# Patient Record
Sex: MALE | Race: WHITE | NOT HISPANIC OR LATINO | Employment: OTHER | URBAN - NONMETROPOLITAN AREA
[De-identification: names, ages, dates, MRNs, and addresses within clinical notes are randomized per-mention and may not be internally consistent; named-entity substitution may affect disease eponyms.]

---

## 2017-07-27 ENCOUNTER — OFFICE VISIT - GICH (OUTPATIENT)
Dept: FAMILY MEDICINE | Facility: OTHER | Age: 77
End: 2017-07-27

## 2017-07-27 ENCOUNTER — HISTORY (OUTPATIENT)
Dept: FAMILY MEDICINE | Facility: OTHER | Age: 77
End: 2017-07-27

## 2017-07-27 DIAGNOSIS — R06.09 OTHER FORMS OF DYSPNEA: ICD-10-CM

## 2017-07-27 DIAGNOSIS — C61 MALIGNANT NEOPLASM OF PROSTATE (H): ICD-10-CM

## 2017-07-27 LAB
ABSOLUTE BASOPHILS - HISTORICAL: 0 THOU/CU MM
ABSOLUTE EOSINOPHILS - HISTORICAL: 0.4 THOU/CU MM
ABSOLUTE IMMATURE GRANULOCYTES(METAS,MYELOS,PROS) - HISTORICAL: 0 THOU/CU MM
ABSOLUTE LYMPHOCYTES - HISTORICAL: 2.5 THOU/CU MM (ref 0.9–2.9)
ABSOLUTE MONOCYTES - HISTORICAL: 0.8 THOU/CU MM
ABSOLUTE NEUTROPHILS - HISTORICAL: 4.7 THOU/CU MM (ref 1.7–7)
ANION GAP - HISTORICAL: 11 (ref 5–18)
BASOPHILS # BLD AUTO: 0.5 %
BUN SERPL-MCNC: 37 MG/DL (ref 7–25)
BUN/CREAT RATIO - HISTORICAL: 22
CALCIUM SERPL-MCNC: 9.9 MG/DL (ref 8.6–10.3)
CHLORIDE SERPLBLD-SCNC: 106 MMOL/L (ref 98–107)
CO2 SERPL-SCNC: 25 MMOL/L (ref 21–31)
CREAT SERPL-MCNC: 1.65 MG/DL (ref 0.7–1.3)
EOSINOPHIL NFR BLD AUTO: 4.3 %
ERYTHROCYTE [DISTWIDTH] IN BLOOD BY AUTOMATED COUNT: 11.9 % (ref 11.5–15.5)
GFR IF NOT AFRICAN AMERICAN - HISTORICAL: 41 ML/MIN/1.73M2
GLUCOSE SERPL-MCNC: 91 MG/DL (ref 70–105)
HCT VFR BLD AUTO: 37.2 % (ref 37–53)
HEMOGLOBIN: 12.6 G/DL (ref 13.5–17.5)
IMMATURE GRANULOCYTES(METAS,MYELOS,PROS) - HISTORICAL: 0.2 %
LYMPHOCYTES NFR BLD AUTO: 29.8 % (ref 20–44)
MCH RBC QN AUTO: 30.9 PG (ref 26–34)
MCHC RBC AUTO-ENTMCNC: 33.9 G/DL (ref 32–36)
MCV RBC AUTO: 91 FL (ref 80–100)
MONOCYTES NFR BLD AUTO: 10 %
NEUTROPHILS NFR BLD AUTO: 55.2 % (ref 42–72)
PLATELET # BLD AUTO: 223 THOU/CU MM (ref 140–440)
PMV BLD: 10.8 FL (ref 6.5–11)
POTASSIUM SERPL-SCNC: 4.8 MMOL/L (ref 3.5–5.1)
RED BLOOD COUNT - HISTORICAL: 4.08 MIL/CU MM (ref 4.3–5.9)
SODIUM SERPL-SCNC: 142 MMOL/L (ref 133–143)
WHITE BLOOD COUNT - HISTORICAL: 8.4 THOU/CU MM (ref 4.5–11)

## 2017-07-27 ASSESSMENT — PATIENT HEALTH QUESTIONNAIRE - PHQ9: SUM OF ALL RESPONSES TO PHQ QUESTIONS 1-9: 0

## 2017-08-03 ENCOUNTER — AMBULATORY - GICH (OUTPATIENT)
Dept: SCHEDULING | Facility: OTHER | Age: 77
End: 2017-08-03

## 2017-08-08 ENCOUNTER — AMBULATORY - GICH (OUTPATIENT)
Dept: SCHEDULING | Facility: OTHER | Age: 77
End: 2017-08-08

## 2017-09-21 ENCOUNTER — COMMUNICATION - GICH (OUTPATIENT)
Dept: CARDIAC REHAB | Facility: OTHER | Age: 77
End: 2017-09-21

## 2017-09-27 ENCOUNTER — COMMUNICATION - GICH (OUTPATIENT)
Dept: CARDIAC REHAB | Facility: OTHER | Age: 77
End: 2017-09-27

## 2017-10-30 ENCOUNTER — COMMUNICATION - GICH (OUTPATIENT)
Dept: SURGERY | Facility: OTHER | Age: 77
End: 2017-10-30

## 2017-12-27 NOTE — PROGRESS NOTES
Patient Information     Patient Name MRN Gaudencio Harris [id number] Male 1940      Progress Notes by Alonso Skinner MD at 2017  7:45 AM     Author:  Alonso Skinner MD Service:  (none) Author Type:  Physician     Filed:  2017  8:32 AM Encounter Date:  2017 Status:  Signed     :  Alonso Skinner MD (Physician)            SUBJECTIVE:  Gaudencio Jorge is a 77 y.o. male who presents with his wife for evaluation of shortness of breath, fatigue and chest pain. Over the past couple of months has noted significant dyspnea on exertion. Even 1 flight of stairs will cause him to feel short of breath. During this time he has had several episodes of a mild discomfort in his chest usually at rest that resolves within a couple of minutes. Does not seem to note chest discomfort with exertion. He also has been extremely fatigued and wanting to sleep more than usual. He does have a history of coronary artery disease with stents placed on 2 different dates in . He does have sublingual nitroglycerin at home but has not had to use this. His blood pressure was labile over the winter and he reports having occasional dizzy spells. Above symptoms are somewhere to what he had prior to stenting. They did schedule an appointment with cardiology in [place] for August 3 but were advised to come in today to be evaluated. He reports being diagnosed with prostate cancer recently as well and [facility]    No Known Allergies,   Current Outpatient Prescriptions on File Prior to Visit       Medication  Sig Dispense Refill     aspirin 325 mg tablet Take 1 tablet by mouth once daily with a meal.  0     atenolol (TENORMIN) 50 mg tablet Take 1 tablet by mouth once daily. 90 tablet 3     hyalur ac-chond sul-colg II-AA (HYALURONIC ACID) 40- mg cap Take 20 mg by mouth 2 times daily.       nitroglycerin (NITROSTAT) 0.4 mg SL tablet Place 0.4 mg under the tongue every 5 minutes  "if needed.       omeprazole (PRILOSEC) 20 mg capsule Take 20 mg by mouth once daily before a meal.       No current facility-administered medications on file prior to visit.    , No past medical history on file. and   Past Surgical History:      Procedure  Laterality Date     ANGIOPLASTY  03/22/04    Acute coronary syndrome, hospitalized at [facility] after transfer from [facility].  Cardiac catheterization with angioplasty to right coronary artery with stent placement.  Normal left ventricular systolic performance with an EF of 60%.       COLONOSCOPY SCREENING  12/07    Screening colonoscopy, follow-up recommended in 10 years       DENTAL SURGERY  12/04    Dental extractions       HERNIA REPAIR      Status post left herniorrhaphy        KNEE REPLACEMENT  07/09    Left total knee arthroplasty       PREMALIG/BENIGN SKIN LESION EXCISION      Skin lesion removals       PTCA  05/21/04    Stenting of the first diagonal branch for an acute coronary syndrome, [facility] in [place], Dr. Chantelle Zapata.            Gastrointestinal: Negative  Genitourinary: See history of present illness    OBJECTIVE:  /80  Pulse 54  Temp 97.5  F (36.4  C) (Temporal)  Ht 1.721 m (5' 7.75\")  Wt 86.6 kg (191 lb)  SpO2 100%  BMI 29.26 kg/m2  EXAM:  General Appearance: Pleasant, alert, appropriate appearance for age. No acute distress  Head Exam: Normocephalic, without obvious abnormality.  Chest/Respiratory Exam: Normal chest wall and respirations. Clear to auscultation.  Cardiovascular Exam: Regular rate and rhythm with a faint systolic murmur noted throughout the precordium.  Foot Exam: Normal.    EKG shows a new right bundle branch block. Sinus bradycardia with first-degree AV block was noted previously. Occasional PVCs also evident.    ASSESSMENT/Plan :      ICD-10-CM    1. KELLEY (dyspnea on exertion)  CBC and basic metabolic panel are pending. He will keep his appointment next week with cardiology for further " evaluation. Symptoms consistent with coronary artery disease. Continue current medications. He will call 911 if he develops persistent symptoms.  R06.09 EKG 12 LEAD UNIT PERFORMED (PERFORMED TODAY)      CBC WITH DIFFERENTIAL      BASIC METABOLIC PANEL      CBC WITH DIFFERENTIAL      BASIC METABOLIC PANEL      CBC WITH AUTO DIFFERENTIAL      W[place] ELECTROCARDIOGRAM TRACING   2. Prostate cancer ([facility])  [facility].  C61        Alonso Skinner MD

## 2017-12-28 NOTE — TELEPHONE ENCOUNTER
Patient Information     Patient Name MRN Sex Gaudencio Henderson [id number] Male 1940      Telephone Encounter by Jenna Villegas RN at 2017 10:08 AM     Author:  Jenna Villegas RN Service:  (none) Author Type:  NURS- Registered Nurse     Filed:  2017 10:11 AM Encounter Date:  2017 Status:  Signed     :  Jenna Villegas RN (NURS- Registered Nurse)            Spoke  With patient re: cardiac rehab intake and patient declines at this time as he is getting ready to leave for [place] for the winter and has an exercise program he is doing at home

## 2017-12-30 NOTE — NURSING NOTE
Patient Information     Patient Name MRN Gaudencio Harris [id number] Male 1940      Nursing Note by Gosselin, Norma J at 2017  7:45 AM     Author:  Gosselin, Norma J Service:  (none) Author Type:  (none)     Filed:  2017  7:56 AM Encounter Date:  2017 Status:  Signed     :  Gosselin, Norma J            Patient presents to clinic for shortness of breath when active and fatigue. He also reports he's had chest pain/discomfort 4 times in 2 months.  Norma J Gosselin LPN....................  2017   7:53 AM

## 2018-01-26 VITALS
HEIGHT: 68 IN | SYSTOLIC BLOOD PRESSURE: 138 MMHG | WEIGHT: 191 LBS | DIASTOLIC BLOOD PRESSURE: 80 MMHG | OXYGEN SATURATION: 100 % | HEART RATE: 54 BPM | TEMPERATURE: 97.5 F | BODY MASS INDEX: 28.95 KG/M2

## 2018-02-02 ASSESSMENT — PATIENT HEALTH QUESTIONNAIRE - PHQ9: SUM OF ALL RESPONSES TO PHQ QUESTIONS 1-9: 0

## 2018-03-08 ENCOUNTER — DOCUMENTATION ONLY (OUTPATIENT)
Dept: FAMILY MEDICINE | Facility: OTHER | Age: 78
End: 2018-03-08

## 2018-03-08 PROBLEM — M19.90 OSTEOARTHROSIS: Status: ACTIVE | Noted: 2018-03-08

## 2018-03-08 PROBLEM — E66.9 OBESITY: Status: ACTIVE | Noted: 2018-03-08

## 2018-03-08 PROBLEM — Z87.891 PERSONAL HISTORY OF TOBACCO USE, PRESENTING HAZARDS TO HEALTH: Status: ACTIVE | Noted: 2018-03-08

## 2018-03-08 PROBLEM — I10 HYPERTENSION: Status: ACTIVE | Noted: 2018-03-08

## 2018-03-08 PROBLEM — C61 PROSTATE CANCER (H): Status: ACTIVE | Noted: 2017-07-27

## 2018-03-08 PROBLEM — E78.5 HYPERLIPIDEMIA: Status: ACTIVE | Noted: 2018-03-08

## 2018-03-08 RX ORDER — CHONDROIT/COLLAGEN/HYALURON/AA 80-400-40
20 CAPSULE ORAL 2 TIMES DAILY
COMMUNITY
End: 2023-04-24

## 2018-03-08 RX ORDER — LISINOPRIL AND HYDROCHLOROTHIAZIDE 20; 25 MG/1; MG/1
1 TABLET ORAL DAILY
COMMUNITY
End: 2023-04-24

## 2018-03-08 RX ORDER — ROSUVASTATIN CALCIUM 40 MG/1
40 TABLET, COATED ORAL DAILY
COMMUNITY

## 2018-03-08 RX ORDER — IBUPROFEN 200 MG
600 TABLET ORAL 4 TIMES DAILY PRN
COMMUNITY
Start: 2017-07-27 | End: 2019-04-12

## 2018-03-08 RX ORDER — ATENOLOL 50 MG/1
50 TABLET ORAL DAILY
COMMUNITY
Start: 2013-08-05 | End: 2019-04-12

## 2018-03-08 RX ORDER — ASPIRIN 325 MG
325 TABLET ORAL
COMMUNITY
Start: 2013-08-05 | End: 2019-04-12

## 2018-03-08 RX ORDER — NITROGLYCERIN 0.4 MG/1
0.4 TABLET SUBLINGUAL EVERY 5 MIN PRN
COMMUNITY

## 2018-06-27 DIAGNOSIS — M75.42 IMPINGEMENT SYNDROME, SHOULDER, LEFT: Primary | ICD-10-CM

## 2018-06-27 DIAGNOSIS — S46.009A ROTATOR CUFF INJURY: ICD-10-CM

## 2018-06-28 ENCOUNTER — HOSPITAL ENCOUNTER (OUTPATIENT)
Dept: PHYSICAL THERAPY | Facility: OTHER | Age: 78
Setting detail: THERAPIES SERIES
End: 2018-06-28
Attending: PHYSICIAN ASSISTANT
Payer: COMMERCIAL

## 2018-06-28 PROCEDURE — 97162 PT EVAL MOD COMPLEX 30 MIN: CPT | Mod: GP

## 2018-06-28 PROCEDURE — 40000185 ZZHC STATISTIC PT OUTPT VISIT

## 2018-06-28 PROCEDURE — 97110 THERAPEUTIC EXERCISES: CPT | Mod: GP

## 2018-06-28 NOTE — PROGRESS NOTES
06/28/18 0700   General Information   Type of Visit Initial OP Ortho PT Evaluation   Start of Care Date 06/28/18   Referring Physician Alonso Nguyễn PA-C   Patient/Family Goals Statement decrease pain   Orders Evaluate and Treat   Orders Comment RTC and scapular strengthening   Date of Order 06/27/18   [facility] Type Other  ([facility])   Insurance Comments/Visits Authorized 8   Medical Diagnosis L shoulder impingement   Surgical/Medical history reviewed Yes  (current prostate cancer & found spot on lung,HBP,stents,TKA)   Body Part(s)   Body Part(s) Shoulder   Presentation and Etiology   Pertinent history of current problem (include personal factors and/or comorbidities that impact the POC) L shoulder has been bothering him since last fall injection 6/26/18 and yesterday no different, today better.  Pain increased with reaching back to reach table behind from chair, has to reach over with R hand to get cup of cofffy.  Roll up window.  Donning sweater.  1/10 at rest, 8-9/10 with certain movments.  Workout daily, lift weights, bench press 95#, overhead press 20# B, bicep curls 20# B, tricep extensions overhead 15#, supine fly 15# B, can put aspercream and tylenol but no help.  IBP helped but having kidney disease.  Feels better after workout.  Getting up in morning can feel it.      Impairments A. Pain   Functional Limitations perform activities of daily living   Symptom Location L shoulder   How/Where did it occur From insidious onset   Onset date of current episode/exacerbation 09/01/17   Chronicity Chronic   Pain rating (0-10 point scale) Best (/10);Worst (/10)   Best (/10) 1   Worst (/10) 9   Pain quality A. Sharp   Frequency of pain/symptoms B. Intermittent   Pain/symptoms exacerbated by G. Certain positions   Pain/symptoms eased by F. Certain positions   Fall Risk Screen   Fall screen completed by PT   Have you fallen 2 or more times in the past year? No   Have you fallen and had an injury in the past year? No   Is  patient a fall risk? No   Shoulder Objective Findings   Observation dealing with cancer, twin brother tripple bipass   Posture depressed position L shoulder   Shoulder ROM Comment flex R 150, L 138, abd R 135, L 123, ER R 85, L 80, IR R 47, L 50   Scapulothoracic Rhythm more scapular movement with L than R with scaption   Neer's Test positive L for impingment   Burton-Tanner Test positive L for impingment   Planned Therapy Interventions   Planned Therapy Interventions joint mobilization;ROM;strengthening;stretching;neuromuscular re-education   Planned Modality Interventions   Planned Modality Interventions Cryotherapy  (with vasopnuematic compression)   Clinical Impression   Criteria for Skilled Therapeutic Interventions Met yes, treatment indicated   PT Diagnosis L shoulder impingment   Influenced by the following impairments pain, decreased ROM   Functional limitations due to impairments limited dressing, reaching, roll up window in truck   Clinical Presentation Evolving/Changing   Clinical Presentation Rationale chronic, age, co-morbidities   Clinical Decision Making (Complexity) Moderate complexity   Therapy Frequency 1 time/week   Predicted Duration of Therapy Intervention (days/wks) up to 12 weeks (up to 8 visits)   Risk & Benefits of therapy have been explained Yes   Patient, Family & other staff in agreement with plan of care Yes   ORTHO GOALS   PT Ortho Eval Goals 1;2;3   Ortho Goal 1   Goal Identifier reaching   Goal Description Pt able to reach behind himself to get coffee cup off side tablewithout pain in 12 weeks.    Target Date 09/20/18   Ortho Goal 2   Goal Identifier dressing   Goal Description Pt able to don shirt without pain in 12 weeks   Target Date 09/20/18   Ortho Goal 3   Goal Identifier HEP   Goal Description pt able to manage any residual sx with HEP in 12 weeks.    Target Date 09/20/18   Total Evaluation Time   Total Evaluation Time 30

## 2018-07-06 ENCOUNTER — HOSPITAL ENCOUNTER (OUTPATIENT)
Dept: PHYSICAL THERAPY | Facility: OTHER | Age: 78
Setting detail: THERAPIES SERIES
End: 2018-07-06
Attending: PHYSICIAN ASSISTANT
Payer: COMMERCIAL

## 2018-07-06 PROCEDURE — 40000185 ZZHC STATISTIC PT OUTPT VISIT

## 2018-07-06 PROCEDURE — 97110 THERAPEUTIC EXERCISES: CPT | Mod: GP

## 2018-07-23 ENCOUNTER — HOSPITAL ENCOUNTER (OUTPATIENT)
Dept: PHYSICAL THERAPY | Facility: OTHER | Age: 78
Setting detail: THERAPIES SERIES
End: 2018-07-23
Payer: COMMERCIAL

## 2018-07-23 PROCEDURE — 40000185 ZZHC STATISTIC PT OUTPT VISIT

## 2018-07-23 PROCEDURE — 97110 THERAPEUTIC EXERCISES: CPT | Mod: GP

## 2018-07-23 NOTE — PROGRESS NOTES
Patient Information     Patient Name  Gaudencio Jorge MRN  [id number] Sex  Male   1940      Letter by Marilee Patel at      Author:  Marilee Patel Service:  (none) Author Type:  (none)    Filed:   Encounter Date:  10/30/2017 Status:  (Other)           Gaudencio Jorge  [address] [facility] [address]          2017    Dear Mr. Jorge:    It has come to our attention that you are due for a colonoscopy.  According to our records, your last colonoscopy was done on 07.  It  is time for your repeat colonoscopy.  Please contact the Surgery department at [phone] and we will be happy to set up this colonoscopy for you.  If you have had a colonoscopy since your last one with us, please let us know so we can update our records.      Sincerely,       Marilee HUIZAR LPN  General Surgery      Reviewed and electronically signed by provider.

## 2018-07-23 NOTE — PROGRESS NOTES
Patient Information     Patient Name  Gaudencio Jorge MRN  [id number] Sex  Male   1940      Letter by Alonso Skinner MD at      Author:  Alonso Skinner MD Service:  (none) Author Type:  (none)    Filed:   Encounter Date:  2017 Status:  (Other)       Gaudencio Jorge  [address]  [place] [address]    2017      Dear Mr. Jorge,      Please find enclosed a copy of your recent laboratory studies.    Your basic metabolic panel was notable for slightly decreased kidney function. This has worsened slightly compared to 3 years ago.     Your CBC was normal except for a slightly low hemoglobin. This can sometimes be seen with decreased kidney function. It looks like you are due for colonoscopy this year and I would recommend scheduling this at your convenience.      Results for orders placed or performed in visit on 17      BASIC METABOLIC PANEL      Result  Value Ref Range    SODIUM 142 133 - 143 mmol/L    POTASSIUM 4.8 3.5 - 5.1 mmol/L    CHLORIDE 106 98 - 107 mmol/L    CO2,TOTAL 25 21 - 31 mmol/L    ANION GAP 11 5 - 18                    GLUCOSE 91 70 - 105 mg/dL    CALCIUM 9.9 8.6 - 10.3 mg/dL    BUN 37 (H) 7 - 25 mg/dL    CREATININE 1.65 (H) 0.70 - 1.30 mg/dL    BUN/CREAT RATIO           22                    GFR if African American 50 (L) >60 ml/min/1.73m2    GFR if not  41 (L) >60 ml/min/1.73m2   CBC WITH AUTO DIFFERENTIAL      Result  Value Ref Range    WHITE BLOOD COUNT         8.4 4.5 - 11.0 thou/cu mm    RED BLOOD COUNT           4.08 (L) 4.30 - 5.90 mil/cu mm    HEMOGLOBIN                12.6 (L) 13.5 - 17.5 g/dL    HEMATOCRIT                37.2 37.0 - 53.0 %    MCV                       91 80 - 100 fL    MCH                       30.9 26.0 - 34.0 pg    MCHC                      33.9 32.0 - 36.0 g/dL    RDW                       11.9 11.5 - 15.5 %    PLATELET COUNT            223 140 - 440 thou/cu mm    MPV                       10.8 6.5 - 11.0 fL     NEUTROPHILS               55.2 42.0 - 72.0 %    LYMPHOCYTES               29.8 20.0 - 44.0 %    MONOCYTES                 10.0 <12.0 %    EOSINOPHILS               4.3 <8.0 %    BASOPHILS                 0.5 <3.0 %    IMMATURE GRANULOCYTES(METAS,MYELOS,PROS) 0.2 %    ABSOLUTE NEUTROPHILS      4.7 1.7 - 7.0 thou/cu mm    ABSOLUTE LYMPHOCYTES      2.5 0.9 - 2.9 thou/cu mm    ABSOLUTE MONOCYTES        0.8 <0.9 thou/cu mm    ABSOLUTE EOSINOPHILS      0.4 <0.5 thou/cu mm    ABSOLUTE BASOPHILS        0.0 <0.3 thou/cu mm    ABSOLUTE IMMATURE GRANULOCYTES(METAS,MYELOS,PROS) 0.0 <=0.3 thou/cu mm       It was a pleasure seeing you recently in the clinic.  If you have any questions regarding these results, please feel free to call.      Sincerely,       Alonso Skinner MD

## 2018-07-30 ENCOUNTER — HOSPITAL ENCOUNTER (OUTPATIENT)
Dept: PHYSICAL THERAPY | Facility: OTHER | Age: 78
Setting detail: THERAPIES SERIES
End: 2018-07-30
Attending: PHYSICIAN ASSISTANT
Payer: COMMERCIAL

## 2018-07-30 PROCEDURE — 97110 THERAPEUTIC EXERCISES: CPT | Mod: GP

## 2018-07-30 PROCEDURE — 40000185 ZZHC STATISTIC PT OUTPT VISIT

## 2018-07-30 NOTE — PROGRESS NOTES
Outpatient Physical Therapy Discharge Note     Patient: Gaudencio Jorge  : 1940    Beginning/End Dates of Reporting Period:  18 to 2018    Referring Provider: Alonso Nguyễn PA-C    Therapy Diagnosis: L shoulder pain     Client Self Report: Getting energy back, was at wedding last weekend. No increase of pain with increased activity.   He has a biopsy later this week, comfortable with HEP and no further pain so we'll d/c from PT today.     Objective Measurements:  Objective Measure: pain  Details: 0/10, being cautious     Goals:  Goal Identifier reaching   Goal Description Pt able to reach behind himself to get coffee cup off side tablewithout pain in 12 weeks.    Target Date 18   Date Met  18   Progress:     Goal Identifier dressing   Goal Description Pt able to don shirt without pain in 12 weeks   Target Date 18   Date Met  18   Progress:     Goal Identifier HEP   Goal Description pt able to manage any residual sx with HEP in 12 weeks.    Target Date 18   Date Met  18   Progress:     Progress Toward Goals:   Progress this reporting period: met    Plan:  Discharge from therapy.    Discharge:    Reason for Discharge: Patient has met all goals.    Equipment Issued: none    Discharge Plan: Patient to continue home program.

## 2018-08-09 NOTE — ADDENDUM NOTE
Encounter addended by: Brittanie Brown, PT on: 8/9/2018  3:55 PM<BR>     Actions taken: Charge Capture section accepted

## 2019-04-12 ENCOUNTER — OFFICE VISIT (OUTPATIENT)
Dept: FAMILY MEDICINE | Facility: OTHER | Age: 79
End: 2019-04-12
Attending: FAMILY MEDICINE
Payer: MEDICARE

## 2019-04-12 VITALS
RESPIRATION RATE: 18 BRPM | HEIGHT: 68 IN | TEMPERATURE: 97.1 F | DIASTOLIC BLOOD PRESSURE: 86 MMHG | HEART RATE: 88 BPM | SYSTOLIC BLOOD PRESSURE: 138 MMHG | OXYGEN SATURATION: 97 % | BODY MASS INDEX: 28.07 KG/M2 | WEIGHT: 185.2 LBS

## 2019-04-12 DIAGNOSIS — N18.30 CHRONIC KIDNEY DISEASE, STAGE III (MODERATE) (H): ICD-10-CM

## 2019-04-12 DIAGNOSIS — C61 PROSTATE CANCER (H): ICD-10-CM

## 2019-04-12 DIAGNOSIS — I10 ESSENTIAL HYPERTENSION: ICD-10-CM

## 2019-04-12 DIAGNOSIS — I25.9 ISCHEMIC HEART DISEASE: ICD-10-CM

## 2019-04-12 DIAGNOSIS — Z01.818 PREOP GENERAL PHYSICAL EXAM: Primary | ICD-10-CM

## 2019-04-12 LAB
ANION GAP SERPL CALCULATED.3IONS-SCNC: 12 MMOL/L (ref 3–14)
BUN SERPL-MCNC: 41 MG/DL (ref 7–25)
CALCIUM SERPL-MCNC: 10 MG/DL (ref 8.6–10.3)
CHLORIDE SERPL-SCNC: 105 MMOL/L (ref 98–107)
CO2 SERPL-SCNC: 24 MMOL/L (ref 21–31)
CREAT SERPL-MCNC: 2.13 MG/DL (ref 0.7–1.3)
ERYTHROCYTE [DISTWIDTH] IN BLOOD BY AUTOMATED COUNT: 12.1 % (ref 10–15)
GFR SERPL CREATININE-BSD FRML MDRD: 30 ML/MIN/{1.73_M2}
GLUCOSE SERPL-MCNC: 142 MG/DL (ref 70–105)
HCT VFR BLD AUTO: 37.4 % (ref 40–53)
HGB BLD-MCNC: 12.6 G/DL (ref 13.3–17.7)
MCH RBC QN AUTO: 30.7 PG (ref 26.5–33)
MCHC RBC AUTO-ENTMCNC: 33.7 G/DL (ref 31.5–36.5)
MCV RBC AUTO: 91 FL (ref 78–100)
PLATELET # BLD AUTO: 263 10E9/L (ref 150–450)
POTASSIUM SERPL-SCNC: 4.1 MMOL/L (ref 3.5–5.1)
RBC # BLD AUTO: 4.11 10E12/L (ref 4.4–5.9)
SODIUM SERPL-SCNC: 141 MMOL/L (ref 134–144)
WBC # BLD AUTO: 11 10E9/L (ref 4–11)

## 2019-04-12 PROCEDURE — 93010 ELECTROCARDIOGRAM REPORT: CPT | Performed by: INTERNAL MEDICINE

## 2019-04-12 PROCEDURE — G0463 HOSPITAL OUTPT CLINIC VISIT: HCPCS

## 2019-04-12 PROCEDURE — 93005 ELECTROCARDIOGRAM TRACING: CPT

## 2019-04-12 PROCEDURE — 36415 COLL VENOUS BLD VENIPUNCTURE: CPT | Performed by: FAMILY MEDICINE

## 2019-04-12 PROCEDURE — 99214 OFFICE O/P EST MOD 30 MIN: CPT | Performed by: FAMILY MEDICINE

## 2019-04-12 PROCEDURE — 80048 BASIC METABOLIC PNL TOTAL CA: CPT | Performed by: FAMILY MEDICINE

## 2019-04-12 PROCEDURE — 85027 COMPLETE CBC AUTOMATED: CPT | Performed by: FAMILY MEDICINE

## 2019-04-12 RX ORDER — METOPROLOL SUCCINATE 50 MG/1
50 TABLET, EXTENDED RELEASE ORAL
COMMUNITY
Start: 2017-08-24 | End: 2023-04-24

## 2019-04-12 ASSESSMENT — PAIN SCALES - GENERAL: PAINLEVEL: NO PAIN (0)

## 2019-04-12 ASSESSMENT — MIFFLIN-ST. JEOR: SCORE: 1539.56

## 2019-04-12 NOTE — LETTER
April 12, 2019      Gaudencio Jorge  [address] [place] [address]        Dear ,    We are writing to inform you of your test results.  Electrolytes are normal.  Your creatinine, which is a kidney test is higher than the last check here from July 2017.  Additionally the urea nitrogen level is also higher.  These are both measurements of kidney function and have increased.  This could be because of kidney damage, often due to high blood pressure.  However it could be from urinary blockage that can happen with prostate enlargement or prostate cancer.  I would make sure that the urologist and [facility] are aware of the current level.  If it has increased from your previous creatinine [facility] the [facility], then you may need an ultrasound to look for urinary obstruction.  If you do not have urinary obstruction, then typically I would have you see a nephrologist.  We need to carefully manage and treat kidney disease to prevent further deterioration.    Your blood count is stable from the last check.  There is mild anemia, but this can be seen with aging as well as with chronic kidney disease.    None of your labs prevent having surgery.  You are still cleared to have the upcoming prostate volume study.      Resulted Orders   Basic Metabolic Panel   Result Value Ref Range    Sodium 141 134 - 144 mmol/L    Potassium 4.1 3.5 - 5.1 mmol/L    Chloride 105 98 - 107 mmol/L    Carbon Dioxide 24 21 - 31 mmol/L    Anion Gap 12 3 - 14 mmol/L    Glucose 142 (H) 70 - 105 mg/dL    Urea Nitrogen 41 (H) 7 - 25 mg/dL    Creatinine 2.13 (H) 0.70 - 1.30 mg/dL    GFR Estimate 30 (L) >60 mL/min/[1.73_m2]    GFR Estimate If Black 37 (L) >60 mL/min/[1.73_m2]    Calcium 10.0 8.6 - 10.3 mg/dL   CBC W PLT No Diff   Result Value Ref Range    WBC 11.0 4.0 - 11.0 10e9/L    RBC Count 4.11 (L) 4.4 - 5.9 10e12/L    Hemoglobin 12.6 (L) 13.3 - 17.7 g/dL    Hematocrit 37.4 (L) 40.0 - 53.0 %    MCV 91 78 - 100 fl     MCH 30.7 26.5 - 33.0 pg    MCHC 33.7 31.5 - 36.5 g/dL    RDW 12.1 10.0 - 15.0 %    Platelet Count 263 150 - 450 10e9/L       If you have any questions or concerns, please call the clinic at the number listed above.       Sincerely,        Tan Bentley MD

## 2019-04-12 NOTE — PROGRESS NOTES
[facility] AND [facility]  [address]  [place] [address]  [phone]  Dept: [phone]    PRE-OP EVALUATION:  Today's date: 2019    Gaudencio Jorge (: 1940) presents for pre-operative evaluation assessment as requested by Dr. Bentley.  He requires evaluation and anesthesia risk assessment prior to undergoing surgery/procedure for treatment of Volume study for prostate cancer .    Proposed Surgery/ Procedure: volume study for prostate cancer  Date of Surgery/ Procedure: 19  Time of Surgery/ Procedure: [facility]  Hospital/Surgical Facility: [place], [place]  Fax number for surgical facility:   Primary Physician: No Ref-Primary, Physician  Type of Anesthesia Anticipated: to be determined    Patient has a Health Care Directive or Living Will:  NO    1. YES - DO YOU HAVE A HISTORY OF HEART ATTACK, STROKE, STENT, BYPASS OR SURGERY ON AN ARTERY IN THE HEAD, NECK, HEART OR LEG? Pt has had stents placed  2. NO - Do you ever have any pain or discomfort in your chest?  3. NO - Do you have a history of  Heart Failure?  4. NO - Are you troubled by shortness of breath when: walking on the level, up a slight hill or at night?  5. NO - Do you currently have a cold, bronchitis or other respiratory infection?  6. NO - Do you have a cough, shortness of breath or wheezing?  7. NO - Do you sometimes get pains in the calves of your legs when you walk?  8. NO - Do you or anyone in your family have previous history of blood clots?  9. NO - Do you or does anyone in your family have a serious bleeding problem such as prolonged bleeding following surgeries or cuts?  10. NO - Have you ever had problems with anemia or been told to take iron pills?  11. NO - Have you had any abnormal blood loss such as black, tarry or bloody stools, or abnormal vaginal bleeding?  12. NO - Have you ever had a blood transfusion?  13. NO - Have you or any of your relatives ever had problems with  anesthesia?  14. NO - Do you have sleep apnea, excessive snoring or daytime drowsiness?  15. NO - Do you have any prosthetic heart valves?  16. NO - Do you have prosthetic joints?  17. NO - Is there any chance that you may be pregnant?      HPI:     HPI related to upcoming procedure: 79 year old male with history of CAD here for preoperative clearance prior to a volume study prior to brachytherapy for prostate cancer.     History of CAD, stenting in 2004. [facility] Blood pressure initially elevated here, but he was just shoveling. Improved on recheck.    He overall feels well. [place] in [place], returned recently to [place], he says just in time to have to shovel snow today. He has been able to shovel for a few hours today without CP or SOB. Works out regularly without problems.      MEDICAL HISTORY:     Patient Active Problem List    Diagnosis Date Noted     Osteoarthrosis 03/08/2018     Priority: Medium     Overview:   shoulder, elbows and knees       Hyperlipidemia 03/08/2018     Priority: Medium     Hypertension 03/08/2018     Priority: Medium     Obesity 03/08/2018     Priority: Medium     Personal history of tobacco use, presenting hazards to health 03/08/2018     Priority: Medium     Prostate cancer (H) 07/27/2017     Priority: Medium     Chronic kidney disease, stage III (moderate) (H) 08/16/2011     Priority: Medium     GE reflux 08/10/2010     Priority: Medium     History of colonic polyps 12/01/2007     Priority: Medium     Overview:   hyperplastic polyp noted on colonoscopy       Ischemic heart disease 03/22/2004     Priority: Medium     Overview:   status post acute myocardial infarction with PTCA and stent placement        History reviewed. No pertinent past medical history.  Past Surgical History:   Procedure Laterality Date     ANGIOPLASTY      03/22/04,Acute coronary syndrome, hospitalized at [facility] after transfer from [facility].  Cardiac catheterization with  angioplasty to right coronary artery with stent placement.  Normal left ventricular systolic performance with an EF of 60%.     ARTHROPLASTY KNEE      ,Left total knee arthroplasty     COLONOSCOPY      ,Screening colonoscopy, follow-up recommended in 10 years     DENTAL SURGERY      ,Dental extractions     OTHER SURGICAL HISTORY      ,HERNIA REPAIR,Status post left herniorrhaphy     OTHER SURGICAL HISTORY      [id number],PREMALIG/BENIGN SKIN LESION EXCISION,Skin lesion removals     OTHER SURGICAL HISTORY      04,,PTCA,Stenting of the first diagonal branch for an acute coronary syndrome, [facility] in [place], Dr. Chantelle Zapata.     Current Outpatient Medications   Medication Sig Dispense Refill     aspirin (ASA) 81 MG tablet Take 1 tablet (81 mg) by mouth daily       Hyaluronic Acid 400-80-40 MG CAPS Take 20 mg by mouth 2 times daily       lisinopril-hydrochlorothiazide (PRINZIDE/ZESTORETIC) 20-25 MG per tablet Take 1 tablet by mouth daily       metoprolol succinate ER (TOPROL-XL) 50 MG 24 hr tablet Take 50 mg by mouth       nitroGLYcerin (NITROSTAT) 0.4 MG sublingual tablet Place 0.4 mg under the tongue every 5 minutes as needed       omeprazole (PRILOSEC) 20 MG CR capsule Take 20 mg by mouth daily       rosuvastatin (CRESTOR) 40 MG tablet Take 40 mg by mouth daily       OTC products: None, except as noted above    No Known Allergies   Latex Allergy: NO    Social History     Tobacco Use     Smoking status: Former Smoker     Last attempt to quit: 2002     Years since quittin.2     Smokeless tobacco: Former User   Substance Use Topics     Alcohol use: Yes     Alcohol/week: 0.5 oz     History   Drug Use Unknown     Comment: Drug use: Not Asked       REVIEW OF SYSTEMS:   General: Denies general constitutional problems  Eyes: Denies problems  Ears/Nose/Throat: Denies problems  Cardiovascular: Denies problems  Respiratory: Denies problems  Gastrointestinal: Denies problems with  "heartburn  Musculoskeletal: Hurt back in [place], not working out as much lately  Neurologic: Denies problems  Psychiatric: Denies problems      EXAM:   /80   Pulse 88   Temp 97.1  F (36.2  C) (Tympanic)   Resp 18   Ht 1.727 m (5' 8\")   Wt 84 kg (185 lb 3.2 oz)   SpO2 97%   BMI 28.16 kg/m    General Appearance: Pleasant, alert, appropriate appearance for age. No acute distress  Ear Exam: Normal TM's bilaterally.   OroPharynx Exam: Dental hygiene adequate. Normal buccal mucosa. Normal pharynx. Mallampati 2/4.  Neck Exam: Supple, no masses or nodes.  Chest/Respiratory Exam: Normal chest wall and respirations. Clear to auscultation.  Cardiovascular Exam: Regular rate and rhythm. S1, S2, no murmur, click, gallop, or rubs.  Extremities: 2 + pedal pulses.  No lower extremity edema.  Neurologic Exam: Nonfocal; symmetric DTRs, normal gross motor movement, tone, and coordination. No tremor.   Psychiatric: Normal affect and mentation     DIAGNOSTICS:   EKG: sinus rhythm with RBBB. GA interval 196 ms, was 220 ms on last EKG from 2017. Otherwise unchanged.    Recent Labs   Lab Test 07/27/17  1002 07/27/17  0842 08/05/13  1038   HGB  --  12.6*  --    PLT  --  223  --      --  143   POTASSIUM 4.8  --  4.4   CR 1.65*  --  1.41*      Results for orders placed or performed in visit on 04/12/19   Basic Metabolic Panel   Result Value Ref Range    Sodium 141 134 - 144 mmol/L    Potassium 4.1 3.5 - 5.1 mmol/L    Chloride 105 98 - 107 mmol/L    Carbon Dioxide 24 21 - 31 mmol/L    Anion Gap 12 3 - 14 mmol/L    Glucose 142 (H) 70 - 105 mg/dL    Urea Nitrogen 41 (H) 7 - 25 mg/dL    Creatinine 2.13 (H) 0.70 - 1.30 mg/dL    GFR Estimate 30 (L) >60 mL/min/[1.73_m2]    GFR Estimate If Black 37 (L) >60 mL/min/[1.73_m2]    Calcium 10.0 8.6 - 10.3 mg/dL   CBC W PLT No Diff   Result Value Ref Range    WBC 11.0 4.0 - 11.0 10e9/L    RBC Count 4.11 (L) 4.4 - 5.9 10e12/L    Hemoglobin 12.6 (L) 13.3 - 17.7 g/dL    Hematocrit 37.4 (L) 40.0 " - 53.0 %    MCV 91 78 - 100 fl    MCH 30.7 26.5 - 33.0 pg    MCHC 33.7 31.5 - 36.5 g/dL    RDW 12.1 10.0 - 15.0 %    Platelet Count 263 150 - 450 10e9/L      IMPRESSION:       ICD-10-CM    1. Preop general physical exam Z01.818    2. Essential hypertension I10 EKG 12-[facility] READ     CBC W PLT No Diff     Basic Metabolic Panel     Basic Metabolic Panel     CBC W PLT No Diff   3. Ischemic heart disease I25.9 EKG 12-[facility] CLIN[facility] READ     CBC W PLT No Diff     Basic Metabolic Panel     Basic Metabolic Panel     CBC W PLT No Diff   4. Chronic kidney disease, stage III (moderate) (H) N18.3    5. Prostate cancer (H) C61          The proposed surgical procedure is considered LOW risk.    REVISED CARDIAC RISK INDEX  The patient has the following serious cardiovascular risks for perioperative complications such as (MI, PE, VFib and 3  AV Block):  Coronary Artery Disease (MI, positive stress test, angina, Qs on EKG)  Serum Creatinine >2.0 mg/dl  INTERPRETATION: 2 risks: Class III (moderate risk - 6.6% complication rate)    The patient has the following additional risks for perioperative complications:  No identified additional risks        RECOMMENDATIONS:     Continue aspirin through surgery. Continue lisinopril hydrochlorothiazide and metoprolol day of surgery.    Blood pressure is elevated on outside of clinic checks. Goal is under 130/80 given previous CAD. Recommend discussing medication adjustment with [facility] provider.    He is on omeprazole without GERD symptoms, can discuss using less frequent omeprazole with [facility] provider.    Labs obtained today show a stable hemoglobin.  Likely this is anemia due to chronic kidney disease.  Creatinine has now increased over 2.  Last value we have on file is 1.6.  I do not have his values from the [facility].  Have to consider potential urinary obstruction due to prostate versus CKD related to hypertension.  I would obtain a renal ultrasound to assess for obstruction.  If there is none, then he  may benefit from seeing a nephrologist.  Likely the hydrochlorothiazide is ineffective given his current creatinine.  I told him as such today.  Recommend discussing medications and potential ultrasound and nephrology referral with his [facility] provider.    APPROVAL GIVEN to proceed with proposed procedure, without further diagnostic evaluation       Signed Electronically by: Tan Bentley MD    Copy of this evaluation report is provided to requesting physician.

## 2019-04-12 NOTE — PATIENT INSTRUCTIONS
Continue on aspirin 81 mg daily through surgery, do not stop    Goal blood pressure after previous heart stenting or heart disease is typically below 130/80, so you may need blood pressure medication adjustment. Discuss with [facility]  Ask about taking less omeprazole for stomach protection as it can cause pneumonia, diarrhea, low magnesium and bone density issues.       Before Your Surgery      Call your surgeon if there is any change in your health. This includes signs of a cold or flu (such as a sore throat, runny nose, cough, rash or fever).    Do not smoke, drink alcohol or take over the counter medicine (unless your surgeon or primary care doctor tells you to) for the 24 hours before and after surgery.    If you take prescribed drugs: Follow your doctor s orders about which medicines to take and which to stop until after surgery.    Eating and drinking prior to surgery: follow the instructions from your surgeon    Take a shower or bath the night before surgery. Use the soap your surgeon gave you to gently clean your skin. If you do not have soap from your surgeon, use your regular soap. Do not shave or scrub the surgery site.  Wear clean pajamas and have clean sheets on your bed.

## 2019-04-12 NOTE — NURSING NOTE
Pt presents to clinic today for pre-op exam.      Medication Reconciliation: complete  Carie Delong LPN

## 2019-04-15 ENCOUNTER — TELEPHONE (OUTPATIENT)
Dept: FAMILY MEDICINE | Facility: OTHER | Age: 79
End: 2019-04-15

## 2019-04-15 ENCOUNTER — MEDICAL CORRESPONDENCE (OUTPATIENT)
Dept: HEALTH INFORMATION MANAGEMENT | Facility: OTHER | Age: 79
End: 2019-04-15

## 2019-04-15 DIAGNOSIS — C61 MALIGNANT NEOPLASM OF PROSTATE (H): Primary | ICD-10-CM

## 2019-04-15 DIAGNOSIS — Z01.818 PRE-OP EXAM: Primary | ICD-10-CM

## 2019-04-15 LAB
ALBUMIN UR-MCNC: NEGATIVE MG/DL
APPEARANCE UR: CLEAR
BILIRUB UR QL STRIP: NEGATIVE
COLOR UR AUTO: YELLOW
GLUCOSE UR STRIP-MCNC: NEGATIVE MG/DL
HGB UR QL STRIP: NEGATIVE
KETONES UR STRIP-MCNC: NEGATIVE MG/DL
LEUKOCYTE ESTERASE UR QL STRIP: NEGATIVE
NITRATE UR QL: NEGATIVE
PH UR STRIP: 5.5 PH (ref 5–9)
SOURCE: NORMAL
SP GR UR STRIP: 1.01 (ref 1–1.03)
UROBILINOGEN UR STRIP-ACNC: 0.2 EU/DL (ref 0.2–1)

## 2019-04-15 PROCEDURE — 87086 URINE CULTURE/COLONY COUNT: CPT

## 2019-04-15 PROCEDURE — 81003 URINALYSIS AUTO W/O SCOPE: CPT

## 2019-04-15 NOTE — TELEPHONE ENCOUNTER
PVI-Spoke with spouse-[facility] is [facility] for pts pre op. Please call. Thank you.  Love Contreras

## 2019-04-15 NOTE — TELEPHONE ENCOUNTER
After verifying pts name and date of birth with Spouse, Chiquis was notified of message below. Chiquis chapman pt is currently at [facility] lab.  Carie Delong

## 2019-04-18 LAB
BACTERIA SPEC CULT: NORMAL
SPECIMEN SOURCE: NORMAL

## 2019-04-23 ENCOUNTER — OFFICE VISIT (OUTPATIENT)
Dept: FAMILY MEDICINE | Facility: OTHER | Age: 79
End: 2019-04-23
Attending: FAMILY MEDICINE
Payer: MEDICARE

## 2019-04-23 VITALS
SYSTOLIC BLOOD PRESSURE: 130 MMHG | WEIGHT: 185.4 LBS | HEIGHT: 68 IN | OXYGEN SATURATION: 97 % | BODY MASS INDEX: 28.1 KG/M2 | TEMPERATURE: 96.8 F | RESPIRATION RATE: 18 BRPM | DIASTOLIC BLOOD PRESSURE: 80 MMHG | HEART RATE: 60 BPM

## 2019-04-23 DIAGNOSIS — Z01.818 PREOP GENERAL PHYSICAL EXAM: Primary | ICD-10-CM

## 2019-04-23 DIAGNOSIS — I10 ESSENTIAL HYPERTENSION: ICD-10-CM

## 2019-04-23 DIAGNOSIS — N18.30 CHRONIC KIDNEY DISEASE, STAGE III (MODERATE) (H): ICD-10-CM

## 2019-04-23 DIAGNOSIS — R39.89 OTHER SYMPTOMS AND SIGNS INVOLVING THE GENITOURINARY SYSTEM: ICD-10-CM

## 2019-04-23 DIAGNOSIS — I25.9 ISCHEMIC HEART DISEASE: ICD-10-CM

## 2019-04-23 DIAGNOSIS — C61 PROSTATE CANCER (H): ICD-10-CM

## 2019-04-23 PROCEDURE — G0463 HOSPITAL OUTPT CLINIC VISIT: HCPCS

## 2019-04-23 PROCEDURE — 99214 OFFICE O/P EST MOD 30 MIN: CPT | Performed by: FAMILY MEDICINE

## 2019-04-23 ASSESSMENT — MIFFLIN-ST. JEOR: SCORE: 1539.09

## 2019-04-23 ASSESSMENT — PAIN SCALES - GENERAL: PAINLEVEL: NO PAIN (0)

## 2019-04-23 NOTE — PROGRESS NOTES
[facility] AND hospitals  [address]  [place] [address]  [phone]  Dept: [phone]    PRE-OP EVALUATION:  Today's date: 2019    Gaudencio Jorge (: 1940) presents for pre-operative evaluation assessment as requested by Dr. Mims.  He requires evaluation and anesthesia risk assessment prior to undergoing surgery/procedure for treatment of Brachytherapy .    Proposed Surgery/ Procedure: Brachytherapy  Date of Surgery/ Procedure: 19  Time of Surgery/ Procedure: D  Hospital/Surgical Facility: [place], [place]  Fax number for surgical facility:   Primary Physician: No Ref-Primary, Physician  Type of Anesthesia Anticipated: to be determined    Patient has a Health Care Directive or Living Will:  NO    1. YES - DO YOU HAVE A HISTORY OF HEART ATTACK, STROKE, STENT, BYPASS OR SURGERY ON AN ARTERY IN THE HEAD, NECK, HEART OR LEG? Pt has 3 stents  2. NO - Do you ever have any pain or discomfort in your chest?  3. NO - Do you have a history of  Heart Failure?  4. NO - Are you troubled by shortness of breath when: walking on the level, up a slight hill or at night?  5. NO - Do you currently have a cold, bronchitis or other respiratory infection?  6. NO - Do you have a cough, shortness of breath or wheezing?  7. NO - Do you sometimes get pains in the calves of your legs when you walk?  8. NO - Do you or anyone in your family have previous history of blood clots?  9. NO - Do you or does anyone in your family have a serious bleeding problem such as prolonged bleeding following surgeries or cuts?  10. NO - Have you ever had problems with anemia or been told to take iron pills?  11. NO - Have you had any abnormal blood loss such as black, tarry or bloody stools, or abnormal vaginal bleeding?  12. NO - Have you ever had a blood transfusion?  13. NO - Have you or any of your relatives ever had problems with anesthesia?  14. NO - Do you have sleep apnea, excessive snoring or  daytime drowsiness?  15. NO - Do you have any prosthetic heart valves?  16. YES - DO YOU HAVE PROSTHETIC JOINTS? Left knee  17. NO - Is there any chance that you may be pregnant?      HPI:     HPI related to upcoming procedure: 79 year old male with history of CAD here for preoperative clearance prior to brachytherapy for prostate cancer.      History of CAD, stenting in 2004. [facility]. He was supposed to see the [facility] for an annual physical prior to next surgery, but that was rescheduled for after his surgery, so he needs another preop. We just completed lab and preop 11 days ago.     His blood pressure was elevated last appointment, but he was shoveling prior. Today blood pressure is good. Exceeds 4 METS activity without chest discomfort or SOB.     Creatinine is over 2. I do not have labs from [facility], but some loaded in Epic from August 2018 show Cr of 1.8. Apparently he had a renal ultrasound, but is not sure what it showed.      MEDICAL HISTORY:     Patient Active Problem List    Diagnosis Date Noted     Osteoarthrosis 03/08/2018     Priority: Medium     Overview:   shoulder, elbows and knees       Hyperlipidemia 03/08/2018     Priority: Medium     Essential hypertension 03/08/2018     Priority: Medium     Obesity 03/08/2018     Priority: Medium     Personal history of tobacco use, presenting hazards to health 03/08/2018     Priority: Medium     Prostate cancer (H) 07/27/2017     Priority: Medium     Chronic kidney disease, stage III (moderate) (H) 08/16/2011     Priority: Medium     GE reflux 08/10/2010     Priority: Medium     History of colonic polyps 12/01/2007     Priority: Medium     Overview:   hyperplastic polyp noted on colonoscopy       Ischemic heart disease 03/22/2004     Priority: Medium     Overview:   status post acute myocardial infarction with PTCA and stent placement        Past Medical History:   Diagnosis Date     Chronic kidney disease, stage III (moderate) (H)  8/16/2011     Essential hypertension 3/8/2018     GE reflux 8/10/2010     Hyperlipidemia 3/8/2018     Ischemic heart disease 3/22/2004    Overview:  status post acute myocardial infarction with PTCA and stent placement     Prostate cancer (H) 7/27/2017     Past Surgical History:   Procedure Laterality Date     ANGIOPLASTY      03/22/04,Acute coronary syndrome, hospitalized at [facility] after transfer from [facility].  Cardiac catheterization with angioplasty to right coronary artery with stent placement.  Normal left ventricular systolic performance with an EF of 60%.     ARTHROPLASTY KNEE      07/09,Left total knee arthroplasty     COLONOSCOPY      12/07,Screening colonoscopy, follow-up recommended in 10 years     DENTAL SURGERY      12/04,Dental extractions     OTHER SURGICAL HISTORY      ,HERNIA REPAIR,Status post left herniorrhaphy     OTHER SURGICAL HISTORY      [id number],PREMALIG/BENIGN SKIN LESION EXCISION,Skin lesion removals     OTHER SURGICAL HISTORY      05/21/04,,PTCA,Stenting of the first diagonal branch for an acute coronary syndrome, [facility] in [place], Dr. Chantelle Zapata.     Current Outpatient Medications   Medication Sig Dispense Refill     aspirin (ASA) 81 MG tablet Take 1 tablet (81 mg) by mouth daily       Hyaluronic Acid 400-80-40 MG CAPS Take 20 mg by mouth 2 times daily       lisinopril-hydrochlorothiazide (PRINZIDE/ZESTORETIC) 20-25 MG per tablet Take 1 tablet by mouth daily       metoprolol succinate ER (TOPROL-XL) 50 MG 24 hr tablet Take 50 mg by mouth       nitroGLYcerin (NITROSTAT) 0.4 MG sublingual tablet Place 0.4 mg under the tongue every 5 minutes as needed       omeprazole (PRILOSEC) 20 MG CR capsule Take 20 mg by mouth daily       rosuvastatin (CRESTOR) 40 MG tablet Take 40 mg by mouth daily       OTC products: None, except as noted above    No Known Allergies   Latex Allergy: NO    Social History     Tobacco Use     Smoking status: Former Smoker      "Last attempt to quit: 2002     Years since quittin.3     Smokeless tobacco: Former User   Substance Use Topics     Alcohol use: Yes     Alcohol/week: 0.5 oz     History   Drug Use Unknown     Comment: Drug use: Not Asked       REVIEW OF SYSTEMS:   General: Denies general constitutional problems  Eyes: Denies problems  Ears/Nose/Throat: Denies problems  Cardiovascular: Denies problems  Respiratory: Denies problems  Gastrointestinal: Denies problems  Musculoskeletal: Denies problems  Neurologic: Denies problems  Psychiatric: Denies problems      EXAM:   /80   Pulse 60   Temp 96.8  F (36  C) (Tympanic)   Resp 18   Ht 1.725 m (5' 7.91\")   Wt 84.1 kg (185 lb 6.4 oz)   SpO2 97%   BMI 28.26 kg/m    General Appearance: Pleasant, alert, appropriate appearance for age. No acute distress  Ear Exam: Normal TM's bilaterally.   OroPharynx Exam: Dental hygiene adequate. Normal buccal mucosa. Normal pharynx. Mallampati 2/4.  Neck Exam: Supple, no masses or nodes.  Chest/Respiratory Exam: Normal chest wall and respirations. Clear to auscultation.  Cardiovascular Exam: Regular rate and rhythm. S1, S2, no murmur, click, gallop, or rubs.  Extremities: 2 + pedal pulses.  No lower extremity edema.  Neurologic Exam: Nonfocal; symmetric DTRs, normal gross motor movement, tone, and coordination. No tremor.   Psychiatric: Normal affect and mentation       DIAGNOSTICS:   EKG on 19: sinus rhythm with RBBB. KY interval 196 ms, was 220 ms on last EKG from 2017. Otherwise unchanged.      Recent Labs   Lab Test 19  1006 17  1002 17  0842   HGB 12.6*  --  12.6*     --  223    142  --    POTASSIUM 4.1 4.8  --    CR 2.13* 1.65*  --         IMPRESSION:       ICD-10-CM    1. Preop general physical exam Z01.818 CBC W PLT No Diff     Basic Metabolic Panel     Urinalysis w Reflex Microscopic If Positive     Urine Culture Aerobic Bacterial   2. Essential hypertension I10 CBC W PLT No Diff     Basic " Metabolic Panel   3. Ischemic heart disease I25.9 CBC W PLT No Diff     Basic Metabolic Panel   4. Chronic kidney disease, stage III (moderate) (H) N18.3 CBC W PLT No Diff     Basic Metabolic Panel     Urinalysis w Reflex Microscopic If Positive     Urine Culture Aerobic Bacterial   5. Prostate cancer (H) C61 Urinalysis w Reflex Microscopic If Positive     Urine Culture Aerobic Bacterial   6. Other symptoms and signs involving the genitourinary system  R39.89 Urine Culture Aerobic Bacterial         The proposed surgical procedure is considered LOW risk.    REVISED CARDIAC RISK INDEX  The patient has the following serious cardiovascular risks for perioperative complications such as (MI, PE, VFib and 3  AV Block):  Coronary Artery Disease (MI, positive stress test, angina, Qs on EKG)  Serum Creatinine >2.0 mg/dl  INTERPRETATION: 2 risks: Class III (moderate risk - 6.6% complication rate)    The patient has the following additional risks for perioperative complications:  No identified additional risks        RECOMMENDATIONS:     Continue aspirin through surgery. Continue lisinopril hydrochlorothiazide and metoprolol day of surgery    Creatinine has continued to increase. It sounds like he had a renal ultrasound to evaluate for obstruction through the [facility]. If not, this should be completed. I still recommend seeing nephrology. Typically hydrochlorothiazide will be ineffective at his current GFR.     Should have labs prior to procedure, but it is not for 3 weeks and last labs were only 11 days ago. He can return on May 6 for CBC, BMP, UA, UCx.     APPROVAL GIVEN to proceed with proposed procedure, without further diagnostic evaluation assuming labs are normal when he returns.       Signed Electronically by: Tan Bentley MD    Copy of this evaluation report is provided to requesting physician.    [facility] Preop Guidelines    Revised Cardiac Risk Index    Addendum on 5/10/19:   Asked to document review of labs obtained  in follow up.      Results for orders placed or performed in visit on 05/06/19   Basic Metabolic Panel   Result Value Ref Range    Sodium 136 134 - 144 mmol/L    Potassium 4.1 3.5 - 5.1 mmol/L    Chloride 101 98 - 107 mmol/L    Carbon Dioxide 28 21 - 31 mmol/L    Anion Gap 7 3 - 14 mmol/L    Glucose 181 (H) 70 - 105 mg/dL    Urea Nitrogen 30 (H) 7 - 25 mg/dL    Creatinine 1.56 (H) 0.70 - 1.30 mg/dL    GFR Estimate 43 (L) >60 mL/min/[1.73_m2]    GFR Estimate If Black 52 (L) >60 mL/min/[1.73_m2]    Calcium 9.4 8.6 - 10.3 mg/dL   CBC W PLT No Diff   Result Value Ref Range    WBC 7.1 4.0 - 11.0 10e9/L    RBC Count 4.12 (L) 4.4 - 5.9 10e12/L    Hemoglobin 12.3 (L) 13.3 - 17.7 g/dL    Hematocrit 37.6 (L) 40.0 - 53.0 %    MCV 91 78 - 100 fl    MCH 29.9 26.5 - 33.0 pg    MCHC 32.7 31.5 - 36.5 g/dL    RDW 11.7 10.0 - 15.0 %    Platelet Count 222 150 - 450 10e9/L   Urinalysis w Reflex Microscopic If Positive   Result Value Ref Range    Color Urine Yellow     Appearance Urine Clear     Glucose Urine Negative NEG^Negative mg/dL    Bilirubin Urine Negative NEG^Negative    Ketones Urine Negative NEG^Negative mg/dL    Specific Gravity Urine 1.025 1.000 - 1.030    Blood Urine Negative NEG^Negative    pH Urine 5.0 5.0 - 9.0 pH    Protein Albumin Urine Negative NEG^Negative mg/dL    Urobilinogen Urine 0.2 0.2 - 1.0 EU/dL    Nitrite Urine Negative NEG^Negative    Leukocyte Esterase Urine Negative NEG^Negative    Source Midstream Urine    Urine Culture Aerobic Bacterial   Result Value Ref Range    Specimen Description Midstream Urine     Culture Micro No growth        Creatinine improved. Hemoglobin is stable. Urine clear. No concerning findings. OK for surgery.  Tan Bentley MD on 5/10/2019 at 3:58 PM

## 2019-04-23 NOTE — NURSING NOTE
Pt presents to clinic today for preop exam.      Medication Reconciliation: complete  Carie Delong LPN

## 2019-04-23 NOTE — PATIENT INSTRUCTIONS
Continue aspirin  Take blood pressure medications morning of surgery  Return for lab on May 6    [facility] discussion with [facility] is should hydrochlorothiazide be changed due to elevated creatinine and I recommend seeing a nephrologist    Ask about taking less omeprazole for stomach protection as it can cause pneumonia, diarrhea, low magnesium and bone density issues.         Before Your Surgery      Call your surgeon if there is any change in your health. This includes signs of a cold or flu (such as a sore throat, runny nose, cough, rash or fever).    Do not smoke, drink alcohol or take over the counter medicine (unless your surgeon or primary care doctor tells you to) for the 24 hours before and after surgery.    If you take prescribed drugs: Follow your doctor s orders about which medicines to take and which to stop until after surgery.    Eating and drinking prior to surgery: follow the instructions from your surgeon    Take a shower or bath the night before surgery. Use the soap your surgeon gave you to gently clean your skin. If you do not have soap from your surgeon, use your regular soap. Do not shave or scrub the surgery site.  Wear clean pajamas and have clean sheets on your bed.

## 2019-05-06 DIAGNOSIS — R39.89 OTHER SYMPTOMS AND SIGNS INVOLVING THE GENITOURINARY SYSTEM: ICD-10-CM

## 2019-05-06 DIAGNOSIS — C61 PROSTATE CANCER (H): ICD-10-CM

## 2019-05-06 DIAGNOSIS — I10 ESSENTIAL HYPERTENSION: ICD-10-CM

## 2019-05-06 DIAGNOSIS — N18.30 CHRONIC KIDNEY DISEASE, STAGE III (MODERATE) (H): ICD-10-CM

## 2019-05-06 DIAGNOSIS — I25.9 ISCHEMIC HEART DISEASE: ICD-10-CM

## 2019-05-06 DIAGNOSIS — Z01.818 PREOP GENERAL PHYSICAL EXAM: ICD-10-CM

## 2019-05-06 LAB
ALBUMIN UR-MCNC: NEGATIVE MG/DL
ANION GAP SERPL CALCULATED.3IONS-SCNC: 7 MMOL/L (ref 3–14)
APPEARANCE UR: CLEAR
BILIRUB UR QL STRIP: NEGATIVE
BUN SERPL-MCNC: 30 MG/DL (ref 7–25)
CALCIUM SERPL-MCNC: 9.4 MG/DL (ref 8.6–10.3)
CHLORIDE SERPL-SCNC: 101 MMOL/L (ref 98–107)
CO2 SERPL-SCNC: 28 MMOL/L (ref 21–31)
COLOR UR AUTO: YELLOW
CREAT SERPL-MCNC: 1.56 MG/DL (ref 0.7–1.3)
ERYTHROCYTE [DISTWIDTH] IN BLOOD BY AUTOMATED COUNT: 11.7 % (ref 10–15)
GFR SERPL CREATININE-BSD FRML MDRD: 43 ML/MIN/{1.73_M2}
GLUCOSE SERPL-MCNC: 181 MG/DL (ref 70–105)
GLUCOSE UR STRIP-MCNC: NEGATIVE MG/DL
HCT VFR BLD AUTO: 37.6 % (ref 40–53)
HGB BLD-MCNC: 12.3 G/DL (ref 13.3–17.7)
HGB UR QL STRIP: NEGATIVE
KETONES UR STRIP-MCNC: NEGATIVE MG/DL
LEUKOCYTE ESTERASE UR QL STRIP: NEGATIVE
MCH RBC QN AUTO: 29.9 PG (ref 26.5–33)
MCHC RBC AUTO-ENTMCNC: 32.7 G/DL (ref 31.5–36.5)
MCV RBC AUTO: 91 FL (ref 78–100)
NITRATE UR QL: NEGATIVE
PH UR STRIP: 5 PH (ref 5–9)
PLATELET # BLD AUTO: 222 10E9/L (ref 150–450)
POTASSIUM SERPL-SCNC: 4.1 MMOL/L (ref 3.5–5.1)
RBC # BLD AUTO: 4.12 10E12/L (ref 4.4–5.9)
SODIUM SERPL-SCNC: 136 MMOL/L (ref 134–144)
SOURCE: NORMAL
SP GR UR STRIP: 1.02 (ref 1–1.03)
UROBILINOGEN UR STRIP-ACNC: 0.2 EU/DL (ref 0.2–1)
WBC # BLD AUTO: 7.1 10E9/L (ref 4–11)

## 2019-05-06 PROCEDURE — 80048 BASIC METABOLIC PNL TOTAL CA: CPT | Performed by: FAMILY MEDICINE

## 2019-05-06 PROCEDURE — 85027 COMPLETE CBC AUTOMATED: CPT | Performed by: FAMILY MEDICINE

## 2019-05-06 PROCEDURE — 36415 COLL VENOUS BLD VENIPUNCTURE: CPT | Performed by: FAMILY MEDICINE

## 2019-05-06 PROCEDURE — 87086 URINE CULTURE/COLONY COUNT: CPT | Performed by: FAMILY MEDICINE

## 2019-05-06 PROCEDURE — 81003 URINALYSIS AUTO W/O SCOPE: CPT | Performed by: FAMILY MEDICINE

## 2019-05-08 LAB
BACTERIA SPEC CULT: NO GROWTH
SPECIMEN SOURCE: NORMAL

## 2019-05-10 NOTE — PROGRESS NOTES
Results for orders placed or performed in visit on 05/06/19   Basic Metabolic Panel   Result Value Ref Range    Sodium 136 134 - 144 mmol/L    Potassium 4.1 3.5 - 5.1 mmol/L    Chloride 101 98 - 107 mmol/L    Carbon Dioxide 28 21 - 31 mmol/L    Anion Gap 7 3 - 14 mmol/L    Glucose 181 (H) 70 - 105 mg/dL    Urea Nitrogen 30 (H) 7 - 25 mg/dL    Creatinine 1.56 (H) 0.70 - 1.30 mg/dL    GFR Estimate 43 (L) >60 mL/min/[1.73_m2]    GFR Estimate If Black 52 (L) >60 mL/min/[1.73_m2]    Calcium 9.4 8.6 - 10.3 mg/dL   CBC W PLT No Diff   Result Value Ref Range    WBC 7.1 4.0 - 11.0 10e9/L    RBC Count 4.12 (L) 4.4 - 5.9 10e12/L    Hemoglobin 12.3 (L) 13.3 - 17.7 g/dL    Hematocrit 37.6 (L) 40.0 - 53.0 %    MCV 91 78 - 100 fl    MCH 29.9 26.5 - 33.0 pg    MCHC 32.7 31.5 - 36.5 g/dL    RDW 11.7 10.0 - 15.0 %    Platelet Count 222 150 - 450 10e9/L   Urinalysis w Reflex Microscopic If Positive   Result Value Ref Range    Color Urine Yellow     Appearance Urine Clear     Glucose Urine Negative NEG^Negative mg/dL    Bilirubin Urine Negative NEG^Negative    Ketones Urine Negative NEG^Negative mg/dL    Specific Gravity Urine 1.025 1.000 - 1.030    Blood Urine Negative NEG^Negative    pH Urine 5.0 5.0 - 9.0 pH    Protein Albumin Urine Negative NEG^Negative mg/dL    Urobilinogen Urine 0.2 0.2 - 1.0 EU/dL    Nitrite Urine Negative NEG^Negative    Leukocyte Esterase Urine Negative NEG^Negative    Source Midstream Urine    Urine Culture Aerobic Bacterial   Result Value Ref Range    Specimen Description Midstream Urine     Culture Micro No growth

## 2019-07-27 ENCOUNTER — OFFICE VISIT (OUTPATIENT)
Dept: FAMILY MEDICINE | Facility: OTHER | Age: 79
End: 2019-07-27
Attending: NURSE PRACTITIONER
Payer: MEDICARE

## 2019-07-27 VITALS
BODY MASS INDEX: 25.51 KG/M2 | HEART RATE: 80 BPM | TEMPERATURE: 97.2 F | DIASTOLIC BLOOD PRESSURE: 68 MMHG | WEIGHT: 168.3 LBS | HEIGHT: 68 IN | SYSTOLIC BLOOD PRESSURE: 120 MMHG | OXYGEN SATURATION: 97 % | RESPIRATION RATE: 20 BRPM

## 2019-07-27 DIAGNOSIS — N30.01 ACUTE CYSTITIS WITH HEMATURIA: Primary | ICD-10-CM

## 2019-07-27 DIAGNOSIS — R39.89 URINARY PROBLEM: ICD-10-CM

## 2019-07-27 LAB
ALBUMIN UR-MCNC: >299 MG/DL
APPEARANCE UR: CLEAR
BACTERIA #/AREA URNS HPF: ABNORMAL /HPF
BILIRUB UR QL STRIP: NEGATIVE
COLOR UR AUTO: YELLOW
GLUCOSE UR STRIP-MCNC: NEGATIVE MG/DL
HGB UR QL STRIP: ABNORMAL
KETONES UR STRIP-MCNC: NEGATIVE MG/DL
LEUKOCYTE ESTERASE UR QL STRIP: ABNORMAL
NITRATE UR QL: POSITIVE
PH UR STRIP: 5.5 PH (ref 5–9)
RBC #/AREA URNS AUTO: ABNORMAL /HPF
SOURCE: ABNORMAL
SP GR UR STRIP: 1.02 (ref 1–1.03)
UROBILINOGEN UR STRIP-ACNC: 0.2 EU/DL (ref 0.2–1)
WBC #/AREA URNS AUTO: >100 /HPF

## 2019-07-27 PROCEDURE — 81001 URINALYSIS AUTO W/SCOPE: CPT | Mod: ZL | Performed by: NURSE PRACTITIONER

## 2019-07-27 PROCEDURE — 99214 OFFICE O/P EST MOD 30 MIN: CPT | Performed by: NURSE PRACTITIONER

## 2019-07-27 PROCEDURE — 87088 URINE BACTERIA CULTURE: CPT | Mod: ZL | Performed by: NURSE PRACTITIONER

## 2019-07-27 PROCEDURE — 87086 URINE CULTURE/COLONY COUNT: CPT | Mod: ZL | Performed by: NURSE PRACTITIONER

## 2019-07-27 PROCEDURE — G0463 HOSPITAL OUTPT CLINIC VISIT: HCPCS

## 2019-07-27 RX ORDER — MIRABEGRON 25 MG/1
25 TABLET, FILM COATED, EXTENDED RELEASE ORAL
COMMUNITY
Start: 2019-07-15 | End: 2019-08-11

## 2019-07-27 RX ORDER — HYDROCODONE BITARTRATE AND ACETAMINOPHEN 5; 325 MG/1; MG/1
1-2 TABLET ORAL
COMMUNITY
Start: 2019-07-09 | End: 2023-04-24

## 2019-07-27 RX ORDER — NITROFURANTOIN 25; 75 MG/1; MG/1
100 CAPSULE ORAL 2 TIMES DAILY
Qty: 14 CAPSULE | Refills: 0 | Status: SHIPPED | OUTPATIENT
Start: 2019-07-27 | End: 2019-08-11

## 2019-07-27 ASSESSMENT — ENCOUNTER SYMPTOMS
FREQUENCY: 1
FATIGUE: 1
MUSCULOSKELETAL NEGATIVE: 1
CHILLS: 1
NEUROLOGICAL NEGATIVE: 1
ABDOMINAL PAIN: 0
APPETITE CHANGE: 1

## 2019-07-27 ASSESSMENT — MIFFLIN-ST. JEOR: SCORE: 1462.9

## 2019-07-27 ASSESSMENT — PAIN SCALES - GENERAL: PAINLEVEL: EXTREME PAIN (8)

## 2019-07-27 NOTE — PROGRESS NOTES
SUBJECTIVE:   Gaudencio Jorge is a 79 year old male who presents to clinic today for the following health issues:    HPI   History of prostate cancer. Has to cath himself 2 times daily. Urine has become more cloudy, is also having urgency and frequency. Is chilled which is normal for him. No body aches. Not much of an appetite, was getting it back but now is not wanting to eat much.    Patient Active Problem List    Diagnosis Date Noted     Osteoarthrosis 03/08/2018     Priority: Medium     Overview:   shoulder, elbows and knees       Hyperlipidemia 03/08/2018     Priority: Medium     Essential hypertension 03/08/2018     Priority: Medium     Obesity 03/08/2018     Priority: Medium     Personal history of tobacco use, presenting hazards to health 03/08/2018     Priority: Medium     Prostate cancer (H) 07/27/2017     Priority: Medium     Chronic kidney disease, stage III (moderate) (H) 08/16/2011     Priority: Medium     GE reflux 08/10/2010     Priority: Medium     History of colonic polyps 12/01/2007     Priority: Medium     Overview:   hyperplastic polyp noted on colonoscopy       Ischemic heart disease 03/22/2004     Priority: Medium     Overview:   status post acute myocardial infarction with PTCA and stent placement       Past Medical History:   Diagnosis Date     Chronic kidney disease, stage III (moderate) (H) 8/16/2011     Essential hypertension 3/8/2018     GE reflux 8/10/2010     Hyperlipidemia 3/8/2018     Ischemic heart disease 3/22/2004    Overview:  status post acute myocardial infarction with PTCA and stent placement     Prostate cancer (H) 7/27/2017      Past Surgical History:   Procedure Laterality Date     ANGIOPLASTY      03/22/04,Acute coronary syndrome, hospitalized at [facility] after transfer from [facility].  Cardiac catheterization with angioplasty to right coronary artery with stent placement.  Normal left ventricular systolic performance with an EF of 60%.     ARTHROPLASTY  KNEE      ,Left total knee arthroplasty     COLONOSCOPY      ,Screening colonoscopy, follow-up recommended in 10 years     DENTAL SURGERY      ,Dental extractions     OTHER SURGICAL HISTORY      ,HERNIA REPAIR,Status post left herniorrhaphy     OTHER SURGICAL HISTORY      [id number],PREMALIG/BENIGN SKIN LESION EXCISION,Skin lesion removals     OTHER SURGICAL HISTORY      04,,PTCA,Stenting of the first diagonal branch for an acute coronary syndrome, [facility] in [place], Dr. Chantelle Zapata.     Family History   Problem Relation Age of Onset     Heart Disease Mother         Heart Disease,Smoker     Other - See Comments Mother         COPD     Other - See Comments Brother         Overweight     Family History Negative Sister         Good Health     Family History Negative Sister         Good Health     Social History     Tobacco Use     Smoking status: Former Smoker     Last attempt to quit: 2002     Years since quittin.5     Smokeless tobacco: Former User   Substance Use Topics     Alcohol use: Yes     Alcohol/week: 0.5 oz     Social History     Social History Narrative    Second marriage, no children.     Four children by first marriage.    Retired from the [employer].      Was in [employer] 3 years 9 months.     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      Enjoys outdoor activities.     Current Outpatient Medications   Medication Sig Dispense Refill     aspirin (ASA) 81 MG tablet Take 1 tablet (81 mg) by mouth daily       Hyaluronic Acid 400-80-40 MG CAPS Take 20 mg by mouth 2 times daily       HYDROcodone-acetaminophen (NORCO) 5-325 MG tablet Take 1-2 tablets by mouth       lisinopril-hydrochlorothiazide (PRINZIDE/ZESTORETIC) 20-25 MG per tablet Take 1 tablet by mouth daily       metoprolol succinate ER (TOPROL-XL) 50 MG 24 hr tablet Take 50 mg by mouth       mirabegron (MYRBETRIQ) 25 MG 24 hr tablet Take 25 mg by mouth       nitroFURantoin  "macrocrystal-monohydrate (MACROBID) 100 MG capsule Take 1 capsule (100 mg) by mouth 2 times daily for 7 days 14 capsule 0     nitroGLYcerin (NITROSTAT) 0.4 MG sublingual tablet Place 0.4 mg under the tongue every 5 minutes as needed       omeprazole (PRILOSEC) 20 MG CR capsule Take 20 mg by mouth daily       rosuvastatin (CRESTOR) 40 MG tablet Take 40 mg by mouth daily       No Known Allergies    Review of Systems   Constitutional: Positive for appetite change, chills and fatigue.   Gastrointestinal: Negative for abdominal pain.   Genitourinary: Positive for frequency and urgency.   Musculoskeletal: Negative.    Skin: Negative.    Neurological: Negative.         OBJECTIVE:     /68 (BP Location: Left arm, Patient Position: Sitting, Cuff Size: Adult Regular)   Pulse 80   Temp 97.2  F (36.2  C) (Tympanic)   Resp 20   Ht 1.727 m (5' 8\")   Wt 76.3 kg (168 lb 4.8 oz)   SpO2 97%   BMI 25.59 kg/m    Body mass index is 25.59 kg/m .  Physical Exam   Constitutional: He is oriented to person, place, and time. He appears well-developed and well-nourished. No distress.   HENT:   Head: Normocephalic and atraumatic.   Neck: Normal range of motion.   Cardiovascular: Normal rate, regular rhythm and normal heart sounds.   Pulmonary/Chest: Effort normal and breath sounds normal.   Abdominal: Soft.   No CVA tenderness   Musculoskeletal: Normal range of motion.   Neurological: He is alert and oriented to person, place, and time.   Skin: Skin is warm and dry. He is not diaphoretic.   Nursing note and vitals reviewed.      Diagnostic Test Results:  Results for orders placed or performed in visit on 07/27/19 (from the past 24 hour(s))   Urinalysis w Reflex Microscopic If Positive   Result Value Ref Range    Color Urine Yellow     Appearance Urine Clear     Glucose Urine Negative NEG^Negative mg/dL    Bilirubin Urine Negative NEG^Negative    Ketones Urine Negative NEG^Negative mg/dL    Specific Gravity Urine 1.025 1.000 - 1.030 "    Blood Urine Large (A) NEG^Negative    pH Urine 5.5 5.0 - 9.0 pH    Protein Albumin Urine >299 (A) NEG^Negative mg/dL    Urobilinogen Urine 0.2 0.2 - 1.0 EU/dL    Nitrite Urine Positive (A) NEG^Negative    Leukocyte Esterase Urine Moderate (A) NEG^Negative    Source Unspecified Urine    Urine Microscopic   Result Value Ref Range    WBC Urine >100 (A) OTO5^0 - 5 /HPF    RBC Urine 2-5 (A) OTO2^O - 2 /HPF    Bacteria Urine Moderate (A) NEG^Negative /HPF       ASSESSMENT/PLAN:       ICD-10-CM    1. Acute cystitis with hematuria N30.01 Urine Culture Aerobic Bacterial     nitroFURantoin macrocrystal-monohydrate (MACROBID) 100 MG capsule   2. Urinary problem R39.89 Urinalysis w Reflex Microscopic If Positive     Urine Microscopic     Serious Comorbid Conditions:  Prostate cancer - currently undergoing radiation  Chronic kidney disease  Hypertension  Lung nodules    PLAN:  UA is positive for nitrates, has large blood and moderate leukocyte esterase.  He is afebrile, appears younger than his stated age.  No acute distress.  He does not appear toxic.  Do to his kidney disease, reviewed creatinine with pharmacy.  Advised to not use Bactrim, okay to go with Macrobid.  Urine culture is pending.  Will notify and change antibiotics if needed.  Advised to monitor symptoms, drink fluids and take medications as ordered.  Follow-up with primary care in 2 days if not improving, return here or ED if symptoms worsen.  Given epic education materials.  I explained my diagnostic considerations and recommendations to pt and wife who voiced understanding and agreement with the treatment plan. All questions were answered. We discussed potential side effects of any prescribed or recommended therapies, as well as expectations for response to treatments.    Disclaimer:  This note consists of words and symbols derived from keyboarding, dictation, or using voice recognition software. As a result, there may be errors in the script that have gone  undetected. Please consider this when interpreting information found in this note.      CECILE Andrade, NP-C  7/27/2019 at 10:29 AM  [facility]

## 2019-07-27 NOTE — NURSING NOTE
Patient in clinic for urinary problem x 2.5 days. Patient is undergoing radiation for prostate cancer.  Melody Fisher LPN....................  7/27/2019   10:20 AM    Chief Complaint   Patient presents with     Urinary Problem       Medication Reconciliation: complete    Melody Fisher LPN

## 2019-07-29 LAB
BACTERIA SPEC CULT: ABNORMAL
SPECIMEN SOURCE: ABNORMAL

## 2019-08-11 ENCOUNTER — OFFICE VISIT (OUTPATIENT)
Dept: FAMILY MEDICINE | Facility: OTHER | Age: 79
End: 2019-08-11
Attending: PHYSICIAN ASSISTANT
Payer: MEDICARE

## 2019-08-11 VITALS
BODY MASS INDEX: 25.01 KG/M2 | WEIGHT: 165 LBS | DIASTOLIC BLOOD PRESSURE: 54 MMHG | HEIGHT: 68 IN | TEMPERATURE: 98.1 F | RESPIRATION RATE: 16 BRPM | SYSTOLIC BLOOD PRESSURE: 98 MMHG | HEART RATE: 60 BPM

## 2019-08-11 DIAGNOSIS — N39.0 URINARY TRACT INFECTION ASSOCIATED WITH CATHETERIZATION OF URINARY TRACT, UNSPECIFIED INDWELLING URINARY CATHETER TYPE, SUBSEQUENT ENCOUNTER: ICD-10-CM

## 2019-08-11 DIAGNOSIS — T83.511D URINARY TRACT INFECTION ASSOCIATED WITH CATHETERIZATION OF URINARY TRACT, UNSPECIFIED INDWELLING URINARY CATHETER TYPE, SUBSEQUENT ENCOUNTER: ICD-10-CM

## 2019-08-11 DIAGNOSIS — R39.89 URINARY PROBLEM: Primary | ICD-10-CM

## 2019-08-11 LAB
ALBUMIN UR-MCNC: 100 MG/DL
APPEARANCE UR: CLEAR
BACTERIA #/AREA URNS HPF: ABNORMAL /HPF
BILIRUB UR QL STRIP: NEGATIVE
COLOR UR AUTO: YELLOW
GLUCOSE UR STRIP-MCNC: NEGATIVE MG/DL
HGB UR QL STRIP: ABNORMAL
KETONES UR STRIP-MCNC: NEGATIVE MG/DL
LEUKOCYTE ESTERASE UR QL STRIP: ABNORMAL
NITRATE UR QL: NEGATIVE
NON-SQ EPI CELLS #/AREA URNS LPF: ABNORMAL /LPF
PH UR STRIP: 5.5 PH (ref 5–9)
RBC #/AREA URNS AUTO: ABNORMAL /HPF
SOURCE: ABNORMAL
SP GR UR STRIP: 1.02 (ref 1–1.03)
UROBILINOGEN UR STRIP-ACNC: 0.2 EU/DL (ref 0.2–1)
WBC #/AREA URNS AUTO: >100 /HPF

## 2019-08-11 PROCEDURE — G0463 HOSPITAL OUTPT CLINIC VISIT: HCPCS

## 2019-08-11 PROCEDURE — 87088 URINE BACTERIA CULTURE: CPT | Mod: ZL | Performed by: FAMILY MEDICINE

## 2019-08-11 PROCEDURE — 87086 URINE CULTURE/COLONY COUNT: CPT | Mod: ZL | Performed by: FAMILY MEDICINE

## 2019-08-11 PROCEDURE — 99214 OFFICE O/P EST MOD 30 MIN: CPT | Performed by: FAMILY MEDICINE

## 2019-08-11 PROCEDURE — 81001 URINALYSIS AUTO W/SCOPE: CPT | Mod: ZL | Performed by: PHYSICIAN ASSISTANT

## 2019-08-11 RX ORDER — CIPROFLOXACIN 500 MG/1
500 TABLET, FILM COATED ORAL 2 TIMES DAILY
Qty: 14 TABLET | Refills: 0 | Status: SHIPPED | OUTPATIENT
Start: 2019-08-11 | End: 2019-08-18

## 2019-08-11 ASSESSMENT — PAIN SCALES - GENERAL: PAINLEVEL: MILD PAIN (2)

## 2019-08-11 ASSESSMENT — MIFFLIN-ST. JEOR: SCORE: 1447.94

## 2019-08-11 NOTE — NURSING NOTE
"Patient presents to clinic for urinary problem x 2 days. Wife states she caths the patient two times a day and that he tried about 10 times yesterday with no results. Patient states itching and burning with urination, frequency and urgency. Wife states urine is cloudy. History of prostate cancer, did radioactive seed implant, and then 25 tx of radiation which ended 7/29/19.   Chief Complaint   Patient presents with     Urinary Problem       Initial BP 98/54   Pulse 60   Temp 98.1  F (36.7  C) (Tympanic)   Resp 16   Ht 1.727 m (5' 8\")   Wt 74.8 kg (165 lb)   BMI 25.09 kg/m   Estimated body mass index is 25.09 kg/m  as calculated from the following:    Height as of this encounter: 1.727 m (5' 8\").    Weight as of this encounter: 74.8 kg (165 lb).  Medication Reconciliation: complete    Juanita Mullen LPN    "

## 2019-08-11 NOTE — PROGRESS NOTES
Chief complaint urinary discomfort and probable UTI    [facility] most pleasant 79-year-old gentleman presents with family because of the above acute urinary symptoms again.  Treated 2 weeks ago for an acute urinary tract infection.  That was Staphylococcus coag negative and did respond to the Macrobid prescribed.  However now is developed symptoms again of urinary tract infection.  He does have self-catheterization with help twice a day.  [facility] [place] and is scheduled to see him soon for follow-up to his prostate cancer.  He generally does not feel that great but is not developing any fever chills or sweats but unfortunately also is having some difficulty with bowel movements.  He does use a mild stool softener and laxative.  He is sore in the region of his coccyx with straining.  Unfortunate is been spending more time on the toilet also and difficulty voiding with fat.    Past medical history medical chart reviewed and noted    Medications noted    Allergies none    Review of systems conference of the unremarkable other than noted in HPI    Physical exam very alert pleasant elderly gentleman who does not appear to be in any acute distress  Vital signs are satisfactory and is afebrile  Head neck exam grossly normal  Chest clear  Cardiovascular regular rate no murmur  Abdomen soft nontender, no flank pain tenderness  Orthopedic and neurological exam grossly normal  Urinalysis is abnormal with white blood cells red blood cell and bacteria moderate    Assessment is acute and recurrent UTI related to catheter  Plan will be fluoroquinolone for 7 days 500 mg twice daily and follow-up with urologist for determination of whether he is a candidate for suppressive antibiotics.  The patient has lots of questions about catheter use of other other alternatives.  Suggested reviewing that further with his urology or primary care provider.

## 2019-08-13 LAB
BACTERIA SPEC CULT: ABNORMAL
SPECIMEN SOURCE: ABNORMAL

## 2019-09-07 ENCOUNTER — OFFICE VISIT (OUTPATIENT)
Dept: FAMILY MEDICINE | Facility: OTHER | Age: 79
End: 2019-09-07
Attending: NURSE PRACTITIONER
Payer: MEDICARE

## 2019-09-07 VITALS
HEART RATE: 75 BPM | BODY MASS INDEX: 26.48 KG/M2 | OXYGEN SATURATION: 94 % | DIASTOLIC BLOOD PRESSURE: 70 MMHG | WEIGHT: 174.7 LBS | HEIGHT: 68 IN | TEMPERATURE: 96.8 F | SYSTOLIC BLOOD PRESSURE: 122 MMHG | RESPIRATION RATE: 16 BRPM

## 2019-09-07 DIAGNOSIS — R39.15 URINARY URGENCY: Primary | ICD-10-CM

## 2019-09-07 LAB
ALBUMIN UR-MCNC: >299 MG/DL
APPEARANCE UR: CLEAR
BACTERIA #/AREA URNS HPF: ABNORMAL /HPF
BILIRUB UR QL STRIP: NEGATIVE
COLOR UR AUTO: YELLOW
GLUCOSE UR STRIP-MCNC: 250 MG/DL
HGB UR QL STRIP: ABNORMAL
HYALINE CASTS #/AREA URNS LPF: ABNORMAL /LPF
KETONES UR STRIP-MCNC: NEGATIVE MG/DL
LEUKOCYTE ESTERASE UR QL STRIP: NEGATIVE
NITRATE UR QL: NEGATIVE
NON-SQ EPI CELLS #/AREA URNS LPF: ABNORMAL /LPF
PH UR STRIP: 7 PH (ref 5–9)
RBC #/AREA URNS AUTO: ABNORMAL /HPF
SOURCE: ABNORMAL
SP GR UR STRIP: 1.02 (ref 1–1.03)
UROBILINOGEN UR STRIP-ACNC: 0.2 EU/DL (ref 0.2–1)
WBC #/AREA URNS AUTO: ABNORMAL /HPF

## 2019-09-07 PROCEDURE — 99213 OFFICE O/P EST LOW 20 MIN: CPT | Performed by: PHYSICIAN ASSISTANT

## 2019-09-07 PROCEDURE — 81001 URINALYSIS AUTO W/SCOPE: CPT | Mod: ZL,XU | Performed by: NURSE PRACTITIONER

## 2019-09-07 PROCEDURE — G0463 HOSPITAL OUTPT CLINIC VISIT: HCPCS

## 2019-09-07 PROCEDURE — 87086 URINE CULTURE/COLONY COUNT: CPT | Mod: ZL | Performed by: PHYSICIAN ASSISTANT

## 2019-09-07 RX ORDER — DOXYCYCLINE 100 MG/1
100 TABLET ORAL 2 TIMES DAILY
Qty: 14 TABLET | Refills: 0 | Status: SHIPPED | OUTPATIENT
Start: 2019-09-07 | End: 2019-09-14

## 2019-09-07 RX ORDER — TAMSULOSIN HYDROCHLORIDE 0.4 MG/1
0.4 CAPSULE ORAL
COMMUNITY
Start: 2019-08-13

## 2019-09-07 ASSESSMENT — MIFFLIN-ST. JEOR: SCORE: 1491.93

## 2019-09-07 ASSESSMENT — PAIN SCALES - GENERAL: PAINLEVEL: SEVERE PAIN (7)

## 2019-09-07 NOTE — PROGRESS NOTES
"SUBJECTIVE:   Gaudencio Jorge is a 79 year old male presenting with a chief complaint of   Chief Complaint   Patient presents with     Urinary Problem     Nursing Notes:   Gina Oakes LPN  9/7/2019 11:30 AM  Signed  Patient has not been feeling well for 1 day. Their symptoms are possible UTI.   Gina Oakes LPN....................  9/7/2019   11:11 AM      Gina Oakes LPN  9/7/2019 12:00 PM  Signed  Glucometer Results 248mg/dL at 11:55AM, provider ELIZABETH Egan PA-C notified.   Gina Oakes LPN....................  9/7/2019   11:59 AM    HPI:  Gaudencio presents to [facility]. He has pain with urination and has urgency since last night.  He has a history of prostate cancer.  He gets catheterized twice a day for retained urine.  His wife states that seemed to be getting better; they were down to 200-300 ml with caths lately.  No fever, nausea, chills, malaise, back pain.   Surgery seed implants in May and radiation ended in July.  Yesterday, everything seemed fine. In fact he was planning a trip to [place] and busy packing but then in the night he noticed the urgency.  He has burning with urination, worse than usual with his UTIs and some whitish sediment in the urine.  He has not noted hematuria.      He has had frequent UTIs that grow staph on culture and sensitivities have not been done since it is routine staff.  He has been treated with macrobid, Cipro and, most recently, doxycycline.  The doxycycline seemed to work the best with symptom relief. His wife states she was questioning whether the macrobid or Cipro really helped as he seemed to get return of symptoms while still on it. He has appt this coming week with a urology PA in Dr. Mims's office.      Patient admits to history of \"borderline diabetes\".  He is not on any medications for this.  He does not monitor his blood sugars at home.  He had toast with jelly prior to arrival.        Review of Systems  See HPI.  Past Medical History:   Diagnosis " "Date     Chronic kidney disease, stage III (moderate) (H) 2011     Essential hypertension 3/8/2018     GE reflux 8/10/2010     Hyperlipidemia 3/8/2018     Ischemic heart disease 3/22/2004    Overview:  status post acute myocardial infarction with PTCA and stent placement     Prostate cancer (H) 2017     Family History   Problem Relation Age of Onset     Heart Disease Mother         Heart Disease,Smoker     Other - See Comments Mother         COPD     Other - See Comments Brother         Overweight     Family History Negative Sister         Good Health     Family History Negative Sister         Good Health     Current Outpatient Medications   Medication Sig Dispense Refill             tamsulosin (FLOMAX) 0.4 MG capsule Take 0.4 mg by mouth       aspirin (ASA) 81 MG tablet Take 1 tablet (81 mg) by mouth daily       Hyaluronic Acid 400-80-40 MG CAPS Take 20 mg by mouth 2 times daily       HYDROcodone-acetaminophen (NORCO) 5-325 MG tablet Take 1-2 tablets by mouth       lisinopril-hydrochlorothiazide (PRINZIDE/ZESTORETIC) 20-25 MG per tablet Take 1 tablet by mouth daily       metoprolol succinate ER (TOPROL-XL) 50 MG 24 hr tablet Take 50 mg by mouth       nitroGLYcerin (NITROSTAT) 0.4 MG sublingual tablet Place 0.4 mg under the tongue every 5 minutes as needed       omeprazole (PRILOSEC) 20 MG CR capsule Take 20 mg by mouth daily       rosuvastatin (CRESTOR) 40 MG tablet Take 40 mg by mouth daily      No Known Allergies    Social History     Tobacco Use     Smoking status: Former Smoker     Last attempt to quit: 2002     Years since quittin.6     Smokeless tobacco: Former User   Substance Use Topics     Alcohol use: Yes     Alcohol/week: 0.5 oz       OBJECTIVE  /70 (BP Location: Right arm, Patient Position: Sitting, Cuff Size: Adult Regular)   Pulse 75   Temp 96.8  F (36  C) (Tympanic)   Resp 16   Ht 1.727 m (5' 8\")   Wt 79.2 kg (174 lb 11.2 oz)   SpO2 94%   BMI 26.56 kg/m      Physical " Exam   Constitutional: He appears well-developed and well-nourished. No distress.   Cardiovascular: Normal rate, regular rhythm and normal heart sounds.   Pulmonary/Chest: Effort normal and breath sounds normal.   Abdominal: Soft. There is no tenderness.   No suprapubic or CVA tenderness.   Musculoskeletal: He exhibits no edema.       Labs:  Results for orders placed or performed in visit on 09/07/19 (from the past 24 hour(s))   Urinalysis w Reflex Microscopic If Positive   Result Value Ref Range    Color Urine Yellow     Appearance Urine Clear     Glucose Urine 250 (A) NEG^Negative mg/dL    Bilirubin Urine Negative NEG^Negative    Ketones Urine Negative NEG^Negative mg/dL    Specific Gravity Urine 1.025 1.000 - 1.030    Blood Urine Large (A) NEG^Negative    pH Urine 7.0 5.0 - 9.0 pH    Protein Albumin Urine >299 (A) NEG^Negative mg/dL    Urobilinogen Urine 0.2 0.2 - 1.0 EU/dL    Nitrite Urine Negative NEG^Negative    Leukocyte Esterase Urine Negative NEG^Negative    Source Midstream Urine    Urine Microscopic   Result Value Ref Range    WBC Urine 0 - 5 OTO5^0 - 5 /HPF    RBC Urine 10-25 (A) OTO2^O - 2 /HPF    Hyaline Casts O - 2 OTO2^O - 2 /LPF    Squamous Epithelial /LPF Urine Few FEW^Few /LPF    Bacteria Urine Few (A) NEG^Negative /HPF       ASSESSMENT:      ICD-10-CM    1. Urinary urgency R39.15 Urinalysis w Reflex Microscopic If Positive     Urine Microscopic     doxycycline monohydrate (ADOXA) 100 MG tablet     Urine Culture Aerobic Bacterial        PLAN:  Discussed with patient and wife the results of the urinalysis.  It does not clearly indicate infection.  However, his symptoms sound typical for a urinary tract infection and he certainly has the history.  Ordered urine culture and in the meantime we will start doxycycline twice daily as that has seemed to cover best for his staphylococcal UTIs in the past..  Reviewed hyperglycemia and how this can contribute to urinary frequency, although this would not  cause the hematuria, dysuria or urgency he is having.  He will follow-up at least by phone with his primary care provider on Monday.  He should get a meter and testing supplies to monitor his blood sugars at home.  In the meantime, he was advised to avoid indulging on sweets and carbohydrates.    He will keep appointment in urology clinic this next week.  If he has any worsening symptoms, including but not limited to abdominal or flank pain, fever, vomiting, trouble urinating he should return to the emergency room.  He and his wife understand and agree with plan. Jeaneth Egan PA-C on 9/7/2019 at 3:09 PM          Patient Instructions   Patient Education     Urinary Tract Infections in Men    Urinary tract infections (UTIs) are most often caused by bacteria that invade the urinary tract. The bacteria may come from outside the body. Or they may travel from the skin outside of the rectum into the urethra. Pain in or around the urinary tract is a common symptom for most UTIs. But the only way to know for sure if you have a UTI is to have a urinalysis and urine culture.   Types of UTIs    Cystitis. This is a bladder infection. It is often linked to a blockage from an enlarged prostate. You may have an urgent or frequent need to urinate, and bloody urine. Treatment includes antibiotics and medicine to relax or shrink the prostate. Sometimes you will need surgery.    Urethritis. This is an infection of the urethra. You may have a discharge from the urethra or burning when you urinate. You may also have pain in the urethra or penis. It is treated with antibiotics.    Prostatitis. This is an inflammation or infection of the prostate. You may have an urgent or frequent need to urinate, fever, or burning when you urinate. Or you may have a tender prostate, or a vague feeling of pressure. Prostatitis is treated with a range of medicines, depending on the cause.    Pyelonephritis. This is a kidney infection. If not treated,  it can be serious and damage your kidneys. In severe cases you may need to stay in the hospital. You may have a fever and lower back pain.  Medicines to treat a UTI  Most UTIs are treated with antibiotics. These kill the bacteria. The length of time you need to take them depends on the type of infection. Take antibiotics exactly as directed until all of the medicine is gone. If you don't, the infection may not go away and may become harder to treat. For certain types of UTIs, you may be given other medicine to help treat your symptoms.  Lifestyle changes to treat and prevent UTIs  The lifestyle changes below will help get rid of your current infection. They may also help prevent future UTIs.    Drink plenty of fluids such as water, juice, or other caffeine-free drinks. This helps flush bacteria out of your system.    Empty your bladder when you feel the urge to urinate and before going to sleep. Urine that stays in your bladder promotes infection.    Use condoms during sex. These help prevent UTIs caused by sexually transmitted bacteria.    Keep follow-up appointments with your healthcare provider. He or she can may do tests to make sure the infection has cleared. If needed, more treatment can be started.  Other treatments to prevent UTIs  Most UTIs respond to medicine. But sometimes you will need a procedure or surgery. This can treat an enlarged prostate, or remove a kidney stone or other blockage. Surgery may also treat problems caused by scarring or long-term infections.  Date Last Reviewed: 1/1/2017 2000-2017 [employer]. [address] [place] [address], [place] [address]. All rights reserved. This information is not intended as a substitute for professional medical care. Always follow your healthcare professional's instructions.         Keep appointment with your urology clinic within the next week as planned.  If you become ill with fever, chills, vomiting, unable to urinate or any other concern,  please return to the emergency department.      Your blood sugar is 248.  This is high.  High blood sugars can contribute to frequent urination (although does not usually cause the urgency and most definitely does not cause blood in the urine -- so you have other things going on).  Please follow up with your regular doctor for your diabetes. Avoid sweets and carbohydrates in the meantime.

## 2019-09-07 NOTE — PATIENT INSTRUCTIONS
Patient Education     Urinary Tract Infections in Men    Urinary tract infections (UTIs) are most often caused by bacteria that invade the urinary tract. The bacteria may come from outside the body. Or they may travel from the skin outside of the rectum into the urethra. Pain in or around the urinary tract is a common symptom for most UTIs. But the only way to know for sure if you have a UTI is to have a urinalysis and urine culture.   Types of UTIs    Cystitis. This is a bladder infection. It is often linked to a blockage from an enlarged prostate. You may have an urgent or frequent need to urinate, and bloody urine. Treatment includes antibiotics and medicine to relax or shrink the prostate. Sometimes you will need surgery.    Urethritis. This is an infection of the urethra. You may have a discharge from the urethra or burning when you urinate. You may also have pain in the urethra or penis. It is treated with antibiotics.    Prostatitis. This is an inflammation or infection of the prostate. You may have an urgent or frequent need to urinate, fever, or burning when you urinate. Or you may have a tender prostate, or a vague feeling of pressure. Prostatitis is treated with a range of medicines, depending on the cause.    Pyelonephritis. This is a kidney infection. If not treated, it can be serious and damage your kidneys. In severe cases you may need to stay in the hospital. You may have a fever and lower back pain.  Medicines to treat a UTI  Most UTIs are treated with antibiotics. These kill the bacteria. The length of time you need to take them depends on the type of infection. Take antibiotics exactly as directed until all of the medicine is gone. If you don't, the infection may not go away and may become harder to treat. For certain types of UTIs, you may be given other medicine to help treat your symptoms.  Lifestyle changes to treat and prevent UTIs  The lifestyle changes below will help get rid of your  current infection. They may also help prevent future UTIs.    Drink plenty of fluids such as water, juice, or other caffeine-free drinks. This helps flush bacteria out of your system.    Empty your bladder when you feel the urge to urinate and before going to sleep. Urine that stays in your bladder promotes infection.    Use condoms during sex. These help prevent UTIs caused by sexually transmitted bacteria.    Keep follow-up appointments with your healthcare provider. He or she can may do tests to make sure the infection has cleared. If needed, more treatment can be started.  Other treatments to prevent UTIs  Most UTIs respond to medicine. But sometimes you will need a procedure or surgery. This can treat an enlarged prostate, or remove a kidney stone or other blockage. Surgery may also treat problems caused by scarring or long-term infections.  Date Last Reviewed: 1/1/2017 2000-2017 [employer]. [address] [place] [address]. All rights reserved. This information is not intended as a substitute for professional medical care. Always follow your healthcare professional's instructions.         Keep appointment with your urology clinic within the next week as planned.  If you become ill with fever, chills, vomiting, unable to urinate or any other concern, please return to the emergency department.      Your blood sugar is 248.  This is high.  High blood sugars can contribute to frequent urination (although does not usually cause the urgency and most definitely does not cause blood in the urine -- so you have other things going on).  Please follow up with your regular doctor for your diabetes. Avoid sweets and carbohydrates in the meantime.

## 2019-09-07 NOTE — NURSING NOTE
Glucometer Results 248mg/dL at 11:55AM, provider ELIZABETH Egan PA-C notified.   Gina Oakes LPN....................  9/7/2019   11:59 AM

## 2019-09-07 NOTE — NURSING NOTE
Patient has not been feeling well for 1 day. Their symptoms are possible UTI.   Gina Oakes LPN....................  9/7/2019   11:11 AM

## 2019-09-08 LAB
BACTERIA SPEC CULT: NORMAL
SPECIMEN SOURCE: NORMAL

## 2020-03-11 ENCOUNTER — HEALTH MAINTENANCE LETTER (OUTPATIENT)
Age: 80
End: 2020-03-11

## 2020-12-27 ENCOUNTER — HEALTH MAINTENANCE LETTER (OUTPATIENT)
Age: 80
End: 2020-12-27

## 2021-03-29 NOTE — PATIENT INSTRUCTIONS
Patient Education   We have a urine culture pending that will tell us which antibiotic will work best for this infection. After discussing with the pharmacist, we will use Macrobid which is a broad spectrum antibiotic to hopefully get you feeling better.   Be sure to drink fluids and monitor your symptoms.   Please call for a follow up appointment with your primary care provider on Monday if not improving.   Return to ER if symptoms worsen.       Catheter-Associated Urinary Tract Infections     A small balloon keeps the catheter in place inside the bladder.   A catheter-associated urinary tract infection (CAUTI) is an infection of the urinary system. CAUTI is caused by bacteria that enter the urinary tract when a urinary catheter is used. This is a tube that s placed into the bladder to drain urine.  The urinary system  This system includes the kidneys, ureters, bladder, and urethra. The kidneys filter blood and make urine. The ureters carry urine from the kidneys to the bladder. The bladder stores urine. The urethra carries urine from the bladder to the outside of the body.  What is a urinary catheter?  A urinary catheter is a thin, flexible tube. It is placed in the bladder to drain urine. Urine flows through the tube into a collecting bag outside of the body. There are different types of urinary catheters. The most common type is an indwelling catheter. This is also known as a urethral catheter. This is because it s placed into the bladder through the urethra. This catheter is also called a Beebe catheter.  Why is a urinary catheter needed?  A urinary catheter is needed for any of the following:    You can t get up to use the toilet because your mobility is limited. This may be due to surgery, an injury, or illness.    You have a blockage in your urinary system.    Your healthcare provider needs to measure the amount of urine you pass.    The function of your kidneys and bladder is being tested.    You re not  able to control your bladder (incontinence).  In most cases, the urinary catheter is temporary. You'll need it only until the problem that requires it is resolved.   How does a CAUTI develop?  Bacteria can enter the urinary tract as the catheter is put into the urethra. Bacteria can also get into the urinary tract while the catheter is in place. The common bacteria that cause a CAUTI are ones that live in the intestine. These bacteria don t normally cause problems in the intestine. But when they get into the urinary tract, a CAUTI can result.  Why is a CAUTI of concern?  Left untreated, a CAUTI can lead to health problems. These problems may include bladder infection, prostate infection, and kidney infection. A CAUTI can prolong your hospital stay. If the infection is not treated in time, serious health complications may occur.  What are the symptoms of a CAUTI?    A burning feeling, pressure, or pain in your lower abdomen    Fever or chills    Urine in the collecting bag is cloudy or bloody (pink or red)    Burning feeling in the urethra or genital area    Aching in the back (kidney area)    Nausea and vomiting    Person is confused, or is not alert, or has a change in behavior (mainly affects older patients)    Note that sometimes a person won t have any symptoms but may still have a CAUTI.  Tell a healthcare provider right away if you or your loved one has any of these symptoms.  How is CAUTI diagnosed?  If you have symptoms of CAUTI your healthcare provider will order tests. These include a urine test, blood tests, and other tests as needed.  How is CAUTI treated?   Treatment may involve any of the following:    Antibiotics. Your healthcare provider will likely prescribe antibiotics if you have symptoms. Be aware that if you don t have symptoms, you may not be given antibiotics. This is to prevent an increase in bacteria that resist (can t be killed by) certain antibiotics.    Removing the catheter. The catheter  will be removed when your healthcare provider decides it s no longer needed. This usually helps stop the infection.    Changing the catheter. If you still need a catheter, the old one will be removed. A new one will be put in. This may help stop the infection.  How do hospital and long-term facility staff prevent CAUTI?  To keep patients from getting a CAUTI, the staff follow certain procedures:    Prescribe a catheter only when it s needed. It is removed as soon as it s no longer needed.    Use sterile (clean) technique when placing the catheter into the urinary tract. This means before putting the catheter in, the caregiver washes his or her hands with soap and water. He or she then puts on sterile gloves. A sterile catheter kit that has cleansers is used to cleanse the patient s genital area.    Before performing catheter care, caregivers also wash their hands or use an alcohol-based hand cleanser.    Hang the bag lower than your bladder. This prevents urine from flowing back into your bladder.    Ensure that the bag is emptied regularly.  What you can do as a patient to prevent CAUTI  You can help prevent yourself from getting a CAUTI by doing the following:    Every day ask your healthcare provider how long you need to have the catheter. The longer you have a catheter, the higher your chance of getting a CAUTI.    If a caregiver doesn t clean his or her hands and put on gloves before touching your catheter, ask them to do so.    If you ve been taught how to care for your catheter, be sure to wash your hands before and after each session.    Make sure your bag is lower than your bladder. If it s not, tell your caregiver.    Don t disconnect the catheter and drain tube. Doing so allows germs to get into the catheter.    Cleansing of the genital and perineal areas is very important to help decrease bacteria in areas surrounding the catheter. Ask your doctor what you should use and how often to clean these  areas.  If you are discharged with an indwelling catheter    Before you leave the hospital, make sure you understand the instructions on how to care for your catheter at home.    Ask your healthcare provider how long you need the catheter. Also ask if you need to make a follow-up appointment to have the catheter removed.    Always use sterile (clean) technique when caring for your catheter. Wash your hands before and after doing any catheter care.    Call your healthcare provider right away if you develop symptoms of a CAUTI (see above).   Date Last Reviewed: 1/1/2017 2000-2018 [employer]. [address] [place] [address]. All rights reserved. This information is not intended as a substitute for professional medical care. Always follow your healthcare professional's instructions.            with patient

## 2021-04-25 ENCOUNTER — HEALTH MAINTENANCE LETTER (OUTPATIENT)
Age: 81
End: 2021-04-25

## 2021-10-09 ENCOUNTER — HEALTH MAINTENANCE LETTER (OUTPATIENT)
Age: 81
End: 2021-10-09

## 2022-05-21 ENCOUNTER — HEALTH MAINTENANCE LETTER (OUTPATIENT)
Age: 82
End: 2022-05-21

## 2022-09-17 ENCOUNTER — HEALTH MAINTENANCE LETTER (OUTPATIENT)
Age: 82
End: 2022-09-17

## 2023-04-24 ENCOUNTER — OFFICE VISIT (OUTPATIENT)
Dept: FAMILY MEDICINE | Facility: OTHER | Age: 83
End: 2023-04-24
Payer: MEDICARE

## 2023-04-24 VITALS
SYSTOLIC BLOOD PRESSURE: 134 MMHG | HEART RATE: 59 BPM | RESPIRATION RATE: 18 BRPM | DIASTOLIC BLOOD PRESSURE: 66 MMHG | BODY MASS INDEX: 28.01 KG/M2 | WEIGHT: 184.2 LBS | OXYGEN SATURATION: 97 % | TEMPERATURE: 96.8 F

## 2023-04-24 DIAGNOSIS — Z48.02 VISIT FOR SUTURE REMOVAL: Primary | ICD-10-CM

## 2023-04-24 PROCEDURE — 99202 OFFICE O/P NEW SF 15 MIN: CPT | Performed by: NURSE PRACTITIONER

## 2023-04-24 PROCEDURE — G0463 HOSPITAL OUTPT CLINIC VISIT: HCPCS

## 2023-04-24 RX ORDER — LOSARTAN POTASSIUM 50 MG/1
50 TABLET ORAL DAILY
COMMUNITY

## 2023-04-24 RX ORDER — CARVEDILOL 3.12 MG/1
3.12 TABLET ORAL 2 TIMES DAILY WITH MEALS
COMMUNITY

## 2023-04-24 RX ORDER — MAGNESIUM 200 MG
250 TABLET ORAL
COMMUNITY

## 2023-04-24 RX ORDER — FERROUS GLUCONATE 324(38)MG
324 TABLET ORAL
COMMUNITY

## 2023-04-24 RX ORDER — VITAMIN B COMPLEX
TABLET ORAL DAILY
COMMUNITY

## 2023-04-24 RX ORDER — TORSEMIDE 20 MG/1
20 TABLET ORAL
COMMUNITY

## 2023-04-24 RX ORDER — TRIAMCINOLONE ACETONIDE 1 MG/G
OINTMENT TOPICAL
COMMUNITY
Start: 2023-04-01

## 2023-04-24 RX ORDER — AMLODIPINE BESYLATE 5 MG/1
5 TABLET ORAL
COMMUNITY

## 2023-04-24 ASSESSMENT — PAIN SCALES - GENERAL: PAINLEVEL: NO PAIN (0)

## 2023-04-24 NOTE — NURSING NOTE
Patient is here with wife to have stitches removed from right side of head. Placed 20 days ago, in [place] at dermatologist for mole removal.     Izzy Hickey LPN .............4/24/2023     11:41 AM

## 2023-04-24 NOTE — PROGRESS NOTES
ASSESSMENT/PLAN:     I have reviewed the nursing notes.  I have reviewed the findings, diagnosis, plan and need for follow up with the patient.      1. 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  He was advised to have the stitches removed after 20 days.  Today is day 20.    Sutures removed without difficulty.    Continue to wash daily with soapy water and may apply antibiotic ointment until healed    Discussed warning signs/symptoms indicative of need to f/u  Follow up if symptoms persist or worsen or concerns      I explained my diagnostic considerations and recommendations to the patient, who voiced understanding and agreement with the treatment plan. All questions were answered. We discussed potential side effects of any prescribed or recommended therapies, as well as expectations for response to treatments.    Gina Barrios NP  [facility] AND [facility]      SUBJECTIVE:   Gaudencio Jorge is a 83 year old male who presents to clinic today for the following health issues:  Suture removal    HPI  Patient is accompanied by his spouse today.  Information is obtained by patient and spouse.  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  He was advised to have the stitches removed after 20 days.  Today is day 20.    Denies any concerns such as redness, pain, drainage, bleeding.  No fevers.  No headaches.  He has been washing in the shower and applying antibiotic ointment as directed.      Past Medical History:   Diagnosis Date     Chronic kidney disease, stage III (moderate) (H) 8/16/2011     Essential hypertension 3/8/2018     GE reflux 8/10/2010     Hyperlipidemia 3/8/2018     Ischemic heart disease 3/22/2004    Overview:  status post acute myocardial infarction with PTCA and stent placement     Prostate cancer (H) 7/27/2017     Past Surgical History:   Procedure Laterality Date     ANGIOPLASTY      03/22/04,Acute  coronary syndrome, hospitalized at [facility] after transfer from [facility].  Cardiac catheterization with angioplasty to right coronary artery with stent placement.  Normal left ventricular systolic performance with an EF of 60%.     ARTHROPLASTY KNEE      ,Left total knee arthroplasty     COLONOSCOPY      ,Screening colonoscopy, follow-up recommended in 10 years     DENTAL SURGERY      ,Dental extractions     OTHER SURGICAL HISTORY      ,HERNIA REPAIR,Status post left herniorrhaphy     OTHER SURGICAL HISTORY      [id number],PREMALIG/BENIGN SKIN LESION EXCISION,Skin lesion removals     OTHER SURGICAL HISTORY      04,,PTCA,Stenting of the first diagonal branch for an acute coronary syndrome, [facility] in [place], Dr. Chantelle Zapata.     Social History     Tobacco Use     Smoking status: Former     Types: Cigarettes     Quit date: 2002     Years since quittin.3     Passive exposure: Never     Smokeless tobacco: Former   Vaping Use     Vaping status: Never Used   Substance Use Topics     Alcohol use: Yes     Alcohol/week: 0.8 standard drinks of alcohol     Current Outpatient Medications   Medication Sig Dispense Refill     amLODIPine (NORVASC) 5 MG tablet Take 5 mg by mouth       aspirin (ASA) 81 MG tablet Take 1 tablet (81 mg) by mouth daily       carvedilol (COREG) 3.125 MG tablet Take 3.125 mg by mouth 2 times daily (with meals)       empagliflozin (JARDIANCE) 10 MG TABS tablet Take 10 mg by mouth daily       ferrous gluconate (FERGON) 324 (38 Fe) MG tablet Take 324 mg by mouth       losartan (COZAAR) 50 MG tablet Take 50 mg by mouth daily       magnesium 200 MG TABS Take 250 mg by mouth       Multiple Vitamins-Minerals (PRESERVISION AREDS PO)        omeprazole (PRILOSEC) 20 MG CR capsule Take 20 mg by mouth daily       rosuvastatin (CRESTOR) 40 MG tablet Take 40 mg by mouth daily       tamsulosin (FLOMAX) 0.4 MG capsule Take 0.4 mg by mouth       torsemide  (DEMADEX) 20 MG tablet Take 20 mg by mouth       triamcinolone (KENALOG) 0.1 % external ointment APPLY OINTMENT TOPICALLY TO AFFECTED AREA TWICE DAILY TO THREE TIMES DAILY FOR 10 TO 14 DAYS, THEN AS NEEDED AFTER       Vitamin D3 (VITAMIN D, CHOLECALCIFEROL,) 25 mcg (1000 units) tablet Take by mouth daily       Hyaluronic Acid 400-80-40 MG CAPS Take 20 mg by mouth 2 times daily (Patient not taking: Reported on 4/24/2023)       HYDROcodone-acetaminophen (NORCO) 5-325 MG tablet Take 1-2 tablets by mouth (Patient not taking: Reported on 4/24/2023)       lisinopril-hydrochlorothiazide (PRINZIDE/ZESTORETIC) 20-25 MG per tablet Take 1 tablet by mouth daily (Patient not taking: Reported on 4/24/2023)       metoprolol succinate ER (TOPROL-XL) 50 MG 24 hr tablet Take 50 mg by mouth (Patient not taking: Reported on 4/24/2023)       nitroGLYcerin (NITROSTAT) 0.4 MG sublingual tablet Place 0.4 mg under the tongue every 5 minutes as needed       No Known Allergies      Past medical history, past surgical history, current medications and allergies reviewed and accurate to the best of my knowledge.        OBJECTIVE:     /66   Pulse 59   Temp 96.8  F (36  C) (Tympanic)   Resp 18   Wt 83.6 kg (184 lb 3.2 oz)   SpO2 97%   BMI 28.01 kg/m    Body mass index is 28.01 kg/m .     Physical Exam  General Appearance: Well appearing elderly male, appropriate appearance for age. No acute distress  Musculoskeletal:  Equal movement of bilateral upper extremities.  Equal movement of bilateral lower extremities.  Normal gait.   Dermatological: right parietal area of upper portion of scalp with intact sutures with well approximated scabbed surgical incision without surrounding erythema or bruising, no swelling, tenderness, drainage or bleeding.  Sutures removed without difficulty.  Psychological: normal affect, alert, oriented, and pleasant.

## 2023-05-28 ENCOUNTER — OFFICE VISIT (OUTPATIENT)
Dept: FAMILY MEDICINE | Facility: OTHER | Age: 83
End: 2023-05-28
Attending: NURSE PRACTITIONER
Payer: MEDICARE

## 2023-05-28 VITALS
SYSTOLIC BLOOD PRESSURE: 114 MMHG | TEMPERATURE: 97.2 F | BODY MASS INDEX: 26.98 KG/M2 | WEIGHT: 178 LBS | DIASTOLIC BLOOD PRESSURE: 52 MMHG | OXYGEN SATURATION: 98 % | HEART RATE: 60 BPM | HEIGHT: 68 IN | RESPIRATION RATE: 12 BRPM

## 2023-05-28 DIAGNOSIS — N18.4 CKD (CHRONIC KIDNEY DISEASE) STAGE 4, GFR 15-29 ML/MIN (H): ICD-10-CM

## 2023-05-28 DIAGNOSIS — N39.0 COMPLICATED UTI (URINARY TRACT INFECTION): ICD-10-CM

## 2023-05-28 DIAGNOSIS — R39.89 URINARY PROBLEM: Primary | ICD-10-CM

## 2023-05-28 LAB
ALBUMIN SERPL BCG-MCNC: 4.4 G/DL (ref 3.5–5.2)
ALBUMIN UR-MCNC: 30 MG/DL
ALP SERPL-CCNC: 83 U/L (ref 40–129)
ALT SERPL W P-5'-P-CCNC: 18 U/L (ref 10–50)
ANION GAP SERPL CALCULATED.3IONS-SCNC: 13 MMOL/L (ref 7–15)
APPEARANCE UR: ABNORMAL
AST SERPL W P-5'-P-CCNC: 17 U/L (ref 10–50)
BASOPHILS # BLD AUTO: 0 10E3/UL (ref 0–0.2)
BASOPHILS NFR BLD AUTO: 0 %
BILIRUB SERPL-MCNC: 0.5 MG/DL
BILIRUB UR QL STRIP: NEGATIVE
BUN SERPL-MCNC: 44.7 MG/DL (ref 8–23)
CALCIUM SERPL-MCNC: 9.4 MG/DL (ref 8.8–10.2)
CHLORIDE SERPL-SCNC: 101 MMOL/L (ref 98–107)
COLOR UR AUTO: YELLOW
CREAT SERPL-MCNC: 2.44 MG/DL (ref 0.67–1.17)
DEPRECATED HCO3 PLAS-SCNC: 25 MMOL/L (ref 22–29)
EOSINOPHIL # BLD AUTO: 0.2 10E3/UL (ref 0–0.7)
EOSINOPHIL NFR BLD AUTO: 3 %
ERYTHROCYTE [DISTWIDTH] IN BLOOD BY AUTOMATED COUNT: 12 % (ref 10–15)
GFR SERPL CREATININE-BSD FRML MDRD: 26 ML/MIN/1.73M2
GLUCOSE SERPL-MCNC: 222 MG/DL (ref 70–99)
GLUCOSE UR STRIP-MCNC: >1000 MG/DL
HCT VFR BLD AUTO: 38.7 % (ref 40–53)
HGB BLD-MCNC: 12.6 G/DL (ref 13.3–17.7)
HGB UR QL STRIP: ABNORMAL
HOLD SPECIMEN: NORMAL
IMM GRANULOCYTES # BLD: 0 10E3/UL
IMM GRANULOCYTES NFR BLD: 0 %
KETONES UR STRIP-MCNC: NEGATIVE MG/DL
LEUKOCYTE ESTERASE UR QL STRIP: ABNORMAL
LYMPHOCYTES # BLD AUTO: 1.7 10E3/UL (ref 0.8–5.3)
LYMPHOCYTES NFR BLD AUTO: 24 %
MCH RBC QN AUTO: 30.5 PG (ref 26.5–33)
MCHC RBC AUTO-ENTMCNC: 32.6 G/DL (ref 31.5–36.5)
MCV RBC AUTO: 94 FL (ref 78–100)
MONOCYTES # BLD AUTO: 0.6 10E3/UL (ref 0–1.3)
MONOCYTES NFR BLD AUTO: 9 %
NEUTROPHILS # BLD AUTO: 4.7 10E3/UL (ref 1.6–8.3)
NEUTROPHILS NFR BLD AUTO: 64 %
NITRATE UR QL: NEGATIVE
NRBC # BLD AUTO: 0 10E3/UL
NRBC BLD AUTO-RTO: 0 /100
PH UR STRIP: 6 [PH] (ref 5–9)
PLATELET # BLD AUTO: 244 10E3/UL (ref 150–450)
POTASSIUM SERPL-SCNC: 5 MMOL/L (ref 3.4–5.3)
PROT SERPL-MCNC: 7.1 G/DL (ref 6.4–8.3)
RBC # BLD AUTO: 4.13 10E6/UL (ref 4.4–5.9)
RBC URINE: 4 /HPF
SODIUM SERPL-SCNC: 139 MMOL/L (ref 136–145)
SP GR UR STRIP: 1.02 (ref 1–1.03)
UROBILINOGEN UR STRIP-MCNC: NORMAL MG/DL
WBC # BLD AUTO: 7.3 10E3/UL (ref 4–11)
WBC CLUMPS #/AREA URNS HPF: PRESENT /HPF
WBC URINE: >182 /HPF

## 2023-05-28 PROCEDURE — 80053 COMPREHEN METABOLIC PANEL: CPT | Mod: ZL | Performed by: NURSE PRACTITIONER

## 2023-05-28 PROCEDURE — 36415 COLL VENOUS BLD VENIPUNCTURE: CPT | Mod: ZL | Performed by: NURSE PRACTITIONER

## 2023-05-28 PROCEDURE — 87086 URINE CULTURE/COLONY COUNT: CPT | Mod: ZL | Performed by: NURSE PRACTITIONER

## 2023-05-28 PROCEDURE — 81001 URINALYSIS AUTO W/SCOPE: CPT | Mod: ZL

## 2023-05-28 PROCEDURE — 99214 OFFICE O/P EST MOD 30 MIN: CPT | Performed by: NURSE PRACTITIONER

## 2023-05-28 PROCEDURE — G0463 HOSPITAL OUTPT CLINIC VISIT: HCPCS

## 2023-05-28 PROCEDURE — 85025 COMPLETE CBC W/AUTO DIFF WBC: CPT | Mod: ZL | Performed by: NURSE PRACTITIONER

## 2023-05-28 RX ORDER — DOXYCYCLINE 100 MG/1
100 CAPSULE ORAL 2 TIMES DAILY
Status: CANCELLED | OUTPATIENT
Start: 2023-05-28

## 2023-05-28 RX ORDER — DOXYCYCLINE 100 MG/1
100 CAPSULE ORAL 2 TIMES DAILY
Qty: 14 CAPSULE | Refills: 0 | Status: SHIPPED | OUTPATIENT
Start: 2023-05-28 | End: 2023-06-04

## 2023-05-28 ASSESSMENT — PAIN SCALES - GENERAL: PAINLEVEL: SEVERE PAIN (7)

## 2023-05-28 NOTE — NURSING NOTE
Chief Complaint   Patient presents with     Urinary Problem     Patient in clinic with wife   Hx of bladder infections- prev to prostate removal    Patient started Jardiance 2 months ago    Advanced Care Planning on file? no    Medication Review Completed: complete    FOOD SECURITY SCREENING QUESTIONS:    The next two questions are to help us understand your food security.  If you are feeling you need any assistance in this area, we have resources available to support you today.    Hunger Vital Signs:  Within the past 12 months we worried whether our food would run out before we got money to buy more. Never  Within the past 12 months the food we bought just didn't last and we didn't have money to get more. Never    Melody Fisher LPN

## 2023-05-30 DIAGNOSIS — N30.01 ACUTE CYSTITIS WITH HEMATURIA: Primary | ICD-10-CM

## 2023-05-30 LAB — BACTERIA UR CULT: ABNORMAL

## 2023-05-30 RX ORDER — SULFAMETHOXAZOLE AND TRIMETHOPRIM 400; 80 MG/1; MG/1
1 TABLET ORAL 2 TIMES DAILY
Qty: 14 TABLET | Refills: 0 | Status: SHIPPED | OUTPATIENT
Start: 2023-05-30 | End: 2023-06-06

## 2023-06-04 ENCOUNTER — HEALTH MAINTENANCE LETTER (OUTPATIENT)
Age: 83
End: 2023-06-04

## 2023-08-22 ENCOUNTER — OFFICE VISIT (OUTPATIENT)
Dept: FAMILY MEDICINE | Facility: OTHER | Age: 83
End: 2023-08-22
Attending: PHYSICIAN ASSISTANT
Payer: MEDICARE

## 2023-08-22 VITALS
HEIGHT: 68 IN | TEMPERATURE: 95.2 F | SYSTOLIC BLOOD PRESSURE: 121 MMHG | OXYGEN SATURATION: 98 % | DIASTOLIC BLOOD PRESSURE: 67 MMHG | WEIGHT: 176 LBS | RESPIRATION RATE: 18 BRPM | BODY MASS INDEX: 26.67 KG/M2 | HEART RATE: 62 BPM

## 2023-08-22 DIAGNOSIS — N39.0 ACUTE UTI: Primary | ICD-10-CM

## 2023-08-22 DIAGNOSIS — R39.89 URINARY PROBLEM: ICD-10-CM

## 2023-08-22 LAB
ALBUMIN UR-MCNC: NEGATIVE MG/DL
APPEARANCE UR: ABNORMAL
BACTERIA #/AREA URNS HPF: ABNORMAL /HPF
BILIRUB UR QL STRIP: NEGATIVE
COLOR UR AUTO: YELLOW
GLUCOSE UR STRIP-MCNC: 1000 MG/DL
HGB UR QL STRIP: NEGATIVE
KETONES UR STRIP-MCNC: NEGATIVE MG/DL
LEUKOCYTE ESTERASE UR QL STRIP: ABNORMAL
MUCOUS THREADS #/AREA URNS LPF: PRESENT /LPF
NITRATE UR QL: NEGATIVE
PH UR STRIP: 5 [PH] (ref 5–9)
RBC URINE: 2 /HPF
SP GR UR STRIP: 1.01 (ref 1–1.03)
UROBILINOGEN UR STRIP-MCNC: NORMAL MG/DL
WBC CLUMPS #/AREA URNS HPF: PRESENT /HPF
WBC URINE: 133 /HPF

## 2023-08-22 PROCEDURE — 99213 OFFICE O/P EST LOW 20 MIN: CPT | Performed by: PHYSICIAN ASSISTANT

## 2023-08-22 PROCEDURE — 81001 URINALYSIS AUTO W/SCOPE: CPT | Mod: ZL | Performed by: PHYSICIAN ASSISTANT

## 2023-08-22 PROCEDURE — 87086 URINE CULTURE/COLONY COUNT: CPT | Mod: ZL | Performed by: PHYSICIAN ASSISTANT

## 2023-08-22 PROCEDURE — G0463 HOSPITAL OUTPT CLINIC VISIT: HCPCS

## 2023-08-22 RX ORDER — DOXYCYCLINE HYCLATE 100 MG
100 TABLET ORAL 2 TIMES DAILY
Qty: 14 TABLET | Refills: 0 | Status: SHIPPED | OUTPATIENT
Start: 2023-08-22 | End: 2023-08-29

## 2023-08-22 ASSESSMENT — PATIENT HEALTH QUESTIONNAIRE - PHQ9
10. IF YOU CHECKED OFF ANY PROBLEMS, HOW DIFFICULT HAVE THESE PROBLEMS MADE IT FOR YOU TO DO YOUR WORK, TAKE CARE OF THINGS AT HOME, OR GET ALONG WITH OTHER PEOPLE: NOT DIFFICULT AT ALL
SUM OF ALL RESPONSES TO PHQ QUESTIONS 1-9: 7
SUM OF ALL RESPONSES TO PHQ QUESTIONS 1-9: 7

## 2023-08-22 ASSESSMENT — PAIN SCALES - GENERAL: PAINLEVEL: NO PAIN (0)

## 2023-08-22 NOTE — PROGRESS NOTES
"  Assessment & Plan   Problem List Items Addressed This Visit    None  Visit Diagnoses       Acute UTI    -  Primary    Relevant Medications    doxycycline hyclate (VIBRA-TABS) 100 MG tablet    Urinary problem        Relevant Orders    UA Macroscopic with reflex to Microscopic and Culture (Completed)    Urine Culture           UTI:   Completed urinalysis and culture.  Urinalysis was positive for white blood cells.  Urine culture is pending.  Patient was started on doxycycline for treatment of an acute UTI.  Antibiotic has been sent to pharmacy. Please take full course of antibiotic even if symptoms have completely resolved. This helps prevent against antibiotic resistance.     We will culture the urine to see what bacteria grows out of the urine.  We will call if a change of antibiotic is necessary per the culture.  Please increase fluids including water and cranberry juice.  Monitor for fevers, chills, back pain.  Return to clinic after antibiotic completion if symptoms are not resolved.  Call clinic if symptoms change/worsen.      Ordering of each unique test  Prescription drug management       BMI:   Estimated body mass index is 26.76 kg/m  as calculated from the following:    Height as of this encounter: 1.727 m (5' 8\").    Weight as of this encounter: 79.8 kg (176 lb).       See Patient Instructions    Return if symptoms worsen or fail to improve.    Nilsa Hi PA-C  [facility] AND [facility]    Lillie Ratliff is a 83 year old, presenting for the following health issues:  UTI    HPI       Patient is history of having bladder infections.  Over the last 1 week he has had dysuria, frequency, urgency, and cloudy urine.  Up all night frequently with urinating.  No blood in the urine, fevers, chills.  Keeping food and fluids down.  Drinks a lot of water.  History of being on dialysis in the past however he is not currently on dialysis.  [facility]      Review of Systems " "  Constitutional, HEENT, cardiovascular, pulmonary, gi and gu systems are negative, except as otherwise noted.      Objective    /67   Pulse 62   Temp (!) 95.2  F (35.1  C) (Tympanic)   Resp 18   Ht 1.727 m (5' 8\")   Wt 79.8 kg (176 lb)   SpO2 98%   BMI 26.76 kg/m    Body mass index is 26.76 kg/m .  Physical Exam  Vitals and nursing note reviewed.   Constitutional:       Appearance: Normal appearance.   HENT:      Head: Normocephalic and atraumatic.   Cardiovascular:      Rate and Rhythm: Normal rate and regular rhythm.      Heart sounds: Normal heart sounds.   Pulmonary:      Effort: Pulmonary effort is normal.      Breath sounds: Normal breath sounds.   Abdominal:      General: Abdomen is flat. Bowel sounds are normal. There is no distension.      Palpations: Abdomen is soft. There is no mass.      Tenderness: There is no abdominal tenderness. There is no right CVA tenderness, left CVA tenderness, guarding or rebound.      Hernia: No hernia is present.   Musculoskeletal:         General: Normal range of motion.   Skin:     General: Skin is warm and dry.   Neurological:      General: No focal deficit present.      Mental Status: He is alert and oriented to person, place, and time.   Psychiatric:         Mood and Affect: Mood normal.         Behavior: Behavior normal.         Thought Content: Thought content normal.         Judgment: Judgment normal.            Results for orders placed or performed in visit on 08/22/23   UA Macroscopic with reflex to Microscopic and Culture     Status: Abnormal    Specimen: Urine, Midstream   Result Value Ref Range    Color Urine Yellow Colorless, Straw, Light Yellow, Yellow    Appearance Urine Slightly Cloudy (A) Clear    Glucose Urine 1000 (A) Negative mg/dL    Bilirubin Urine Negative Negative    Ketones Urine Negative Negative mg/dL    Specific Gravity Urine 1.006 1.000 - 1.030    Blood Urine Negative Negative    pH Urine 5.0 5.0 - 9.0    Protein Albumin Urine " Negative Negative mg/dL    Urobilinogen Urine Normal Normal, 2.0 mg/dL    Nitrite Urine Negative Negative    Leukocyte Esterase Urine Large (A) Negative    Bacteria Urine Few (A) None Seen /HPF    WBC Clumps Urine Present (A) None Seen /HPF    Mucus Urine Present (A) None Seen /LPF    RBC Urine 2 <=2 /HPF    WBC Urine 133 (H) <=5 /HPF    Narrative    Urine Culture ordered based on laboratory criteria                 Answers submitted by the patient for this visit:  Patient Health Questionnaire (Submitted on 8/22/2023)  If you checked off any problems, how difficult have these problems made it for you to do your work, take care of things at home, or get along with other people?: Not difficult at all  PHQ9 TOTAL SCORE: 7

## 2023-08-22 NOTE — NURSING NOTE
"Chief Complaint   Patient presents with    UTI   Patient presents to clinic for UTI symptoms of having frequency to urinate and burning sensation while urinating. Symptoms have been occurring for 1 week       Trinidad Mary on 8/22/2023 at 11:13 AM        Initial /67   Pulse 62   Temp (!) 95.2  F (35.1  C) (Tympanic)   Resp 18   Ht 1.727 m (5' 8\")   Wt 79.8 kg (176 lb)   SpO2 98%   BMI 26.76 kg/m   Estimated body mass index is 26.76 kg/m  as calculated from the following:    Height as of this encounter: 1.727 m (5' 8\").    Weight as of this encounter: 79.8 kg (176 lb).       FOOD SECURITY SCREENING QUESTIONS:    The next two questions are to help us understand your food security.  If you are feeling you need any assistance in this area, we have resources available to support you today.    Hunger Vital Signs:  Within the past 12 months we worried whether our food would run out before we got money to buy more. Never  Within the past 12 months the food we bought just didn't last and we didn't have money to get more. Never      Trinidad Mary     "

## 2023-08-25 LAB — BACTERIA UR CULT: ABNORMAL

## 2024-04-24 ENCOUNTER — OFFICE VISIT (OUTPATIENT)
Dept: FAMILY MEDICINE | Facility: OTHER | Age: 84
End: 2024-04-24
Attending: FAMILY MEDICINE
Payer: MEDICARE

## 2024-04-24 ENCOUNTER — PATIENT OUTREACH (OUTPATIENT)
Dept: FAMILY MEDICINE | Facility: OTHER | Age: 84
End: 2024-04-24

## 2024-04-24 VITALS
OXYGEN SATURATION: 97 % | RESPIRATION RATE: 20 BRPM | TEMPERATURE: 96.8 F | DIASTOLIC BLOOD PRESSURE: 56 MMHG | HEART RATE: 61 BPM | WEIGHT: 178 LBS | BODY MASS INDEX: 26.98 KG/M2 | SYSTOLIC BLOOD PRESSURE: 134 MMHG | HEIGHT: 68 IN

## 2024-04-24 DIAGNOSIS — Z48.02 VISIT FOR SUTURE REMOVAL: Primary | ICD-10-CM

## 2024-04-24 PROCEDURE — G0463 HOSPITAL OUTPT CLINIC VISIT: HCPCS

## 2024-04-24 PROCEDURE — 99212 OFFICE O/P EST SF 10 MIN: CPT | Performed by: NURSE PRACTITIONER

## 2024-04-24 ASSESSMENT — PAIN SCALES - GENERAL: PAINLEVEL: NO PAIN (0)

## 2024-04-24 NOTE — NURSING NOTE
"Patient presents to  with his wife for suture removal. Patient had surgery on 4/2/24, and head was stitched on 4/3/24. 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     Chief Complaint   Patient presents with    Suture Removal       FOOD SECURITY SCREENING QUESTIONS  Hunger Vital Signs:  Within the past 12 months we worried whether our food would run out before we got money to buy more. Never  Within the past 12 months the food we bought just didn't last and we didn't have money to get more. Never  Mary Jane Deaalekon 4/24/2024 10:55 AM      Initial /56 (BP Location: Right arm, Patient Position: Sitting, Cuff Size: Adult Regular)   Pulse 61   Temp 96.8  F (36  C) (Temporal)   Resp 20   Ht 1.727 m (5' 8\")   Wt 80.7 kg (178 lb)   SpO2 97%   BMI 27.06 kg/m   Estimated body mass index is 27.06 kg/m  as calculated from the following:    Height as of this encounter: 1.727 m (5' 8\").    Weight as of this encounter: 80.7 kg (178 lb).  Medication Reconciliation: complete    Mary Jane Fisher  "

## 2024-04-24 NOTE — PROGRESS NOTES
ASSESSMENT/PLAN:     I have reviewed the nursing notes.  I have reviewed the findings, diagnosis, plan and need for follow up with the patient.        1. Visit for suture removal    - REMOVAL OF SUTURES; Standing  - REMOVAL OF SUTURES    Patient had a cancerous lesion removed from his scalp in [place] on 4/3/23.  He was advised to have the stitches removed after 20 days.   Sutures removed without difficulty.     Continue to wash daily with soapy water and may apply antibiotic ointment until healed     Discussed warning signs/symptoms indicative of need to f/u  Follow up if symptoms persist or worsen or concerns        Discussed warning signs/symptoms indicative of need to f/u  Follow up if symptoms persist or worsen or concerns      I explained my diagnostic considerations and recommendations to the patient, who voiced understanding and agreement with the treatment plan. All questions were answered. We discussed potential side effects of any prescribed or recommended therapies, as well as expectations for response to treatments.    Gina Barrios NP  [facility]      SUBJECTIVE:   Gaudencio Jorge is a 84 year old male who presents to clinic today for the following health issues:  Suture removal      HPI  Patient accompanied today by his spouse.  Information obtained by patient and spouse.  Patient had his annual dermatology visit in [place] with removal of cancerous lesions from his scalp.  The left sided one has had sutures removed and healed.  The right side of scalp is due for suture removal - placed on 4/3/24 and due to be removed at day 20.  Denies any concerns for infection - no bleeding, redness, drainage or pain.  No fevers.    He has been washing/showering and applying antibiotic ointment as directed.          Past Medical History:   Diagnosis Date    Chronic kidney disease, stage III (moderate) (H) 8/16/2011    Essential hypertension 3/8/2018    GE reflux 8/10/2010     Hyperlipidemia 3/8/2018    Ischemic heart disease 3/22/2004    Overview:  status post acute myocardial infarction with PTCA and stent placement    Prostate cancer (H) 2017     Past Surgical History:   Procedure Laterality Date    ANGIOPLASTY      04,Acute coronary syndrome, hospitalized at [facility] after transfer from [facility].  Cardiac catheterization with angioplasty to right coronary artery with stent placement.  Normal left ventricular systolic performance with an EF of 60%.    ARTHROPLASTY KNEE      ,Left total knee arthroplasty    COLONOSCOPY      ,Screening colonoscopy, follow-up recommended in 10 years    DENTAL SURGERY      ,Dental extractions    OTHER SURGICAL HISTORY      ,HERNIA REPAIR,Status post left herniorrhaphy    OTHER SURGICAL HISTORY      [id number],PREMALIG/BENIGN SKIN LESION EXCISION,Skin lesion removals    OTHER SURGICAL HISTORY      04,,PTCA,Stenting of the first diagonal branch for an acute coronary syndrome, [facility] in [place], Dr. Chantelle Zapata.     Social History     Tobacco Use    Smoking status: Former     Current packs/day: 0.00     Types: Cigarettes     Quit date: 2002     Years since quittin.3     Passive exposure: Never    Smokeless tobacco: Former   Substance Use Topics    Alcohol use: Yes     Alcohol/week: 0.8 standard drinks of alcohol     Current Outpatient Medications   Medication Sig Dispense Refill    amLODIPine (NORVASC) 5 MG tablet Take 5 mg by mouth      aspirin (ASA) 81 MG tablet Take 1 tablet (81 mg) by mouth daily      carvedilol (COREG) 3.125 MG tablet Take 3.125 mg by mouth 2 times daily (with meals)      empagliflozin (JARDIANCE) 10 MG TABS tablet Take 10 mg by mouth daily      ferrous gluconate (FERGON) 324 (38 Fe) MG tablet Take 324 mg by mouth      losartan (COZAAR) 50 MG tablet Take 50 mg by mouth daily      magnesium 200 MG TABS Take 250 mg by mouth      Multiple Vitamins-Minerals  "(PRESERVISION AREDS PO)       omeprazole (PRILOSEC) 20 MG CR capsule Take 20 mg by mouth daily      rosuvastatin (CRESTOR) 40 MG tablet Take 40 mg by mouth daily      tamsulosin (FLOMAX) 0.4 MG capsule Take 0.4 mg by mouth      torsemide (DEMADEX) 20 MG tablet Take 20 mg by mouth      triamcinolone (KENALOG) 0.1 % external ointment APPLY OINTMENT TOPICALLY TO AFFECTED AREA TWICE DAILY TO THREE TIMES DAILY FOR 10 TO 14 DAYS, THEN AS NEEDED AFTER      Vitamin D3 (VITAMIN D, CHOLECALCIFEROL,) 25 mcg (1000 units) tablet Take by mouth daily      nitroGLYcerin (NITROSTAT) 0.4 MG sublingual tablet Place 0.4 mg under the tongue every 5 minutes as needed (Patient not taking: Reported on 8/22/2023)       No Known Allergies      Past medical history, past surgical history, current medications and allergies reviewed and accurate to the best of my knowledge.        OBJECTIVE:     /56 (BP Location: Right arm, Patient Position: Sitting, Cuff Size: Adult Regular)   Pulse 61   Temp 96.8  F (36  C) (Temporal)   Resp 20   Ht 1.727 m (5' 8\")   Wt 80.7 kg (178 lb)   SpO2 97%   BMI 27.06 kg/m    Body mass index is 27.06 kg/m .        Physical Exam  General Appearance: Well appearing elderly male, appropriate appearance for age. No acute distress.    Dermatological: right posterior parietal scalp with intact sutures with well approximated and healed surgical incision without surrounding erythema, swelling, bruising, tenderness, drainage, or bleeding.  5 of 5 sutures removed without difficulty.    Psychological: normal affect, alert, oriented, and pleasant.           "

## 2024-04-24 NOTE — TELEPHONE ENCOUNTER
Patient Quality Outreach    Patient is due for the following:   Physical Annual Wellness Visit    Next Steps:   Schedule a Annual Wellness Visit    Type of outreach:    Sent letter.      Questions for provider review:    None           Lyudmila Arango

## 2024-07-13 ENCOUNTER — HEALTH MAINTENANCE LETTER (OUTPATIENT)
Age: 84
End: 2024-07-13

## 2025-07-19 ENCOUNTER — HEALTH MAINTENANCE LETTER (OUTPATIENT)
Age: 85
End: 2025-07-19